# Patient Record
Sex: FEMALE | Race: WHITE | Employment: UNEMPLOYED | URBAN - METROPOLITAN AREA
[De-identification: names, ages, dates, MRNs, and addresses within clinical notes are randomized per-mention and may not be internally consistent; named-entity substitution may affect disease eponyms.]

---

## 2017-01-06 ENCOUNTER — RADIANT APPOINTMENT (OUTPATIENT)
Dept: MAMMOGRAPHY | Facility: CLINIC | Age: 52
End: 2017-01-06
Attending: NURSE PRACTITIONER
Payer: COMMERCIAL

## 2017-01-06 DIAGNOSIS — Z12.31 VISIT FOR SCREENING MAMMOGRAM: ICD-10-CM

## 2017-01-06 DIAGNOSIS — Z00.00 ROUTINE HEALTH MAINTENANCE: ICD-10-CM

## 2017-01-06 PROCEDURE — G0202 SCR MAMMO BI INCL CAD: HCPCS | Mod: TC

## 2017-01-20 ENCOUNTER — TELEPHONE (OUTPATIENT)
Dept: PEDIATRICS | Facility: CLINIC | Age: 52
End: 2017-01-20

## 2017-01-20 DIAGNOSIS — Z12.11 SPECIAL SCREENING FOR MALIGNANT NEOPLASMS, COLON: Primary | ICD-10-CM

## 2017-01-20 NOTE — TELEPHONE ENCOUNTER
1/20/2017    Call Regarding Preventive Health Screening Colonoscopy    Attempt 1    Message on voicemail     Comments:           Outreach   soto

## 2017-04-14 DIAGNOSIS — N95.1 MENOPAUSAL SYNDROME (HOT FLASHES): ICD-10-CM

## 2017-04-14 NOTE — TELEPHONE ENCOUNTER
estrogen, conjugated,-medroxyPROGESTERone (PREMPRO) 0.625-2.5 MG per tablet  Last Written Prescription Date: 12/13/16  Last Fill Quantity: 60, # refills: 0  Last Office Visit with G, [facility] or [facility] prescribing provider: 12/13/16       BP Readings from Last 3 Encounters:   12/13/16 116/74   10/23/15 106/76   08/17/15 98/64     Date of last Breast Exam: 1/6/2017

## 2017-04-14 NOTE — TELEPHONE ENCOUNTER
Prescription approved per [facility] Refill Protocol.  Natty Rodgers, RN  Triage Nurse

## 2017-05-17 NOTE — TELEPHONE ENCOUNTER
5/17/2017    Call Regarding Preventive Health Screening Colonoscopy    Attempt 2    Message on voicemail     Comments:       Outreach   soto

## 2017-09-26 NOTE — TELEPHONE ENCOUNTER
Patient calling back. States she did mail this back, was in February she believes she did this.    Routing back to Nasra Sandoval and PCP for fyi.    Thanks  Wero SPENCER  Team Coodinator

## 2017-09-26 NOTE — TELEPHONE ENCOUNTER
Please let her know we still haven't received her FIT. I ordered a new one at the clinic in case she lost it.

## 2017-10-02 NOTE — TELEPHONE ENCOUNTER
Lab never received test kit.  Called and left VM to  new kit or call if questions.  Aura Wells, CMA

## 2017-12-14 DIAGNOSIS — G43.009 MIGRAINE WITHOUT AURA AND WITHOUT STATUS MIGRAINOSUS, NOT INTRACTABLE: ICD-10-CM

## 2017-12-19 RX ORDER — SUMATRIPTAN 100 MG/1
100 TABLET, FILM COATED ORAL
Qty: 9 TABLET | Refills: 0 | Status: SHIPPED | OUTPATIENT
Start: 2017-12-19 | End: 2018-01-25

## 2017-12-19 NOTE — TELEPHONE ENCOUNTER
Patient is due for annual visit, this is filled x 1,   Please call patient to schedule this.   Natty Rodgers, MAIKEL  Triage Nurse

## 2018-01-25 ENCOUNTER — OFFICE VISIT (OUTPATIENT)
Dept: PEDIATRICS | Facility: CLINIC | Age: 53
End: 2018-01-25
Payer: COMMERCIAL

## 2018-01-25 VITALS
HEIGHT: 67 IN | BODY MASS INDEX: 28.72 KG/M2 | WEIGHT: 183 LBS | OXYGEN SATURATION: 99 % | DIASTOLIC BLOOD PRESSURE: 72 MMHG | TEMPERATURE: 97.6 F | SYSTOLIC BLOOD PRESSURE: 96 MMHG | HEART RATE: 56 BPM

## 2018-01-25 DIAGNOSIS — M25.50 MULTIPLE JOINT PAIN: ICD-10-CM

## 2018-01-25 DIAGNOSIS — Z00.00 ROUTINE GENERAL MEDICAL EXAMINATION AT A HEALTH CARE FACILITY: Primary | ICD-10-CM

## 2018-01-25 DIAGNOSIS — Z12.11 SPECIAL SCREENING FOR MALIGNANT NEOPLASMS, COLON: ICD-10-CM

## 2018-01-25 DIAGNOSIS — H61.22 IMPACTED CERUMEN OF LEFT EAR: ICD-10-CM

## 2018-01-25 DIAGNOSIS — G43.009 MIGRAINE WITHOUT AURA AND WITHOUT STATUS MIGRAINOSUS, NOT INTRACTABLE: ICD-10-CM

## 2018-01-25 DIAGNOSIS — E55.9 VITAMIN D DEFICIENCY: ICD-10-CM

## 2018-01-25 DIAGNOSIS — N95.1 MENOPAUSAL SYNDROME (HOT FLASHES): ICD-10-CM

## 2018-01-25 DIAGNOSIS — Z13.220 SCREENING CHOLESTEROL LEVEL: ICD-10-CM

## 2018-01-25 DIAGNOSIS — L98.9 SKIN LESION: ICD-10-CM

## 2018-01-25 DIAGNOSIS — Z13.1 SCREENING FOR DIABETES MELLITUS: ICD-10-CM

## 2018-01-25 LAB
ALBUMIN SERPL-MCNC: 4.3 G/DL (ref 3.4–5)
ALP SERPL-CCNC: 47 U/L (ref 40–150)
ALT SERPL W P-5'-P-CCNC: 22 U/L (ref 0–50)
ANION GAP SERPL CALCULATED.3IONS-SCNC: 6 MMOL/L (ref 3–14)
AST SERPL W P-5'-P-CCNC: 22 U/L (ref 0–45)
BILIRUB SERPL-MCNC: 0.6 MG/DL (ref 0.2–1.3)
BUN SERPL-MCNC: 12 MG/DL (ref 7–30)
CALCIUM SERPL-MCNC: 9.1 MG/DL (ref 8.5–10.1)
CHLORIDE SERPL-SCNC: 106 MMOL/L (ref 94–109)
CHOLEST SERPL-MCNC: 220 MG/DL
CO2 SERPL-SCNC: 27 MMOL/L (ref 20–32)
CREAT SERPL-MCNC: 0.82 MG/DL (ref 0.52–1.04)
DEPRECATED CALCIDIOL+CALCIFEROL SERPL-MC: 16 UG/L (ref 20–75)
ERYTHROCYTE [SEDIMENTATION RATE] IN BLOOD BY WESTERGREN METHOD: 10 MM/H (ref 0–30)
GFR SERPL CREATININE-BSD FRML MDRD: 73 ML/MIN/1.7M2
GLUCOSE SERPL-MCNC: 83 MG/DL (ref 70–99)
HDLC SERPL-MCNC: 54 MG/DL
LDLC SERPL CALC-MCNC: 141 MG/DL
NONHDLC SERPL-MCNC: 166 MG/DL
POTASSIUM SERPL-SCNC: 3.9 MMOL/L (ref 3.4–5.3)
PROT SERPL-MCNC: 7.8 G/DL (ref 6.8–8.8)
RHEUMATOID FACT SER NEPH-ACNC: <20 IU/ML (ref 0–20)
SODIUM SERPL-SCNC: 139 MMOL/L (ref 133–144)
TRIGL SERPL-MCNC: 126 MG/DL

## 2018-01-25 PROCEDURE — 86200 CCP ANTIBODY: CPT | Performed by: INTERNAL MEDICINE

## 2018-01-25 PROCEDURE — 85652 RBC SED RATE AUTOMATED: CPT | Performed by: INTERNAL MEDICINE

## 2018-01-25 PROCEDURE — 80053 COMPREHEN METABOLIC PANEL: CPT | Performed by: INTERNAL MEDICINE

## 2018-01-25 PROCEDURE — 36415 COLL VENOUS BLD VENIPUNCTURE: CPT | Performed by: INTERNAL MEDICINE

## 2018-01-25 PROCEDURE — 82306 VITAMIN D 25 HYDROXY: CPT | Performed by: INTERNAL MEDICINE

## 2018-01-25 PROCEDURE — 99213 OFFICE O/P EST LOW 20 MIN: CPT | Mod: 25 | Performed by: INTERNAL MEDICINE

## 2018-01-25 PROCEDURE — 86431 RHEUMATOID FACTOR QUANT: CPT | Performed by: INTERNAL MEDICINE

## 2018-01-25 PROCEDURE — 99396 PREV VISIT EST AGE 40-64: CPT | Performed by: INTERNAL MEDICINE

## 2018-01-25 PROCEDURE — 80061 LIPID PANEL: CPT | Performed by: INTERNAL MEDICINE

## 2018-01-25 RX ORDER — SUMATRIPTAN 100 MG/1
100 TABLET, FILM COATED ORAL
Qty: 9 TABLET | Refills: 11 | Status: SHIPPED | OUTPATIENT
Start: 2018-01-25 | End: 2019-01-31

## 2018-01-25 ASSESSMENT — ANXIETY QUESTIONNAIRES
5. BEING SO RESTLESS THAT IT IS HARD TO SIT STILL: NOT AT ALL
IF YOU CHECKED OFF ANY PROBLEMS ON THIS QUESTIONNAIRE, HOW DIFFICULT HAVE THESE PROBLEMS MADE IT FOR YOU TO DO YOUR WORK, TAKE CARE OF THINGS AT HOME, OR GET ALONG WITH OTHER PEOPLE: NOT DIFFICULT AT ALL
3. WORRYING TOO MUCH ABOUT DIFFERENT THINGS: NOT AT ALL
6. BECOMING EASILY ANNOYED OR IRRITABLE: SEVERAL DAYS
7. FEELING AFRAID AS IF SOMETHING AWFUL MIGHT HAPPEN: NOT AT ALL
GAD7 TOTAL SCORE: 1
1. FEELING NERVOUS, ANXIOUS, OR ON EDGE: NOT AT ALL
2. NOT BEING ABLE TO STOP OR CONTROL WORRYING: NOT AT ALL

## 2018-01-25 ASSESSMENT — PATIENT HEALTH QUESTIONNAIRE - PHQ9: 5. POOR APPETITE OR OVEREATING: NOT AT ALL

## 2018-01-25 NOTE — MR AVS SNAPSHOT
After Visit Summary   1/25/2018    Sharon Nascimento    MRN: [id number]           Patient Information     Date Of Birth          1965        Visit Information        Provider Department      1/25/2018 8:40 AM Lashawn Palafox MD [facility]        Today's Diagnoses     Routine general medical examination at a health care facility    -  1    Vitamin D deficiency        Multiple joint pain        Special screening for malignant neoplasms, colon        Screening cholesterol level        Screening for diabetes mellitus        Migraine without aura and without status migrainosus, not intractable        Menopausal syndrome (hot flashes)        Skin lesion          Care Instructions      Preventive Health Recommendations  Female Ages 50 - 64    Yearly exam: See your health care provider every year in order to  o Review health changes.   o Discuss preventive care.    o Review your medicines if your doctor has prescribed any.      Get a Pap test every three years (unless you have an abnormal result and your provider advises testing more often).    If you get Pap tests with HPV test, you only need to test every 5 years, unless you have an abnormal result.     You do not need a Pap test if your uterus was removed (hysterectomy) and you have not had cancer.    You should be tested each year for STDs (sexually transmitted diseases) if you're at risk.     Have a mammogram every 1 to 2 years.    Have a colonoscopy at age 50, or have a yearly FIT test (stool test). These exams screen for colon cancer.      Have a cholesterol test every 5 years, or more often if advised.    Have a diabetes test (fasting glucose) every three years. If you are at risk for diabetes, you should have this test more often.     If you are at risk for osteoporosis (brittle bone disease), think about having a bone density scan (DEXA).    Shots: Get a flu shot each year. Get a tetanus shot every 10 years.    Nutrition:     Eat  at least 5 servings of fruits and vegetables each day.    Eat whole-grain bread, whole-wheat pasta and brown rice instead of white grains and rice.    Talk to your provider about Calcium and Vitamin D.     Lifestyle    Exercise at least 150 minutes a week (30 minutes a day, 5 days a week). This will help you control your weight and prevent disease.    Limit alcohol to one drink per day.    No smoking.     Wear sunscreen to prevent skin cancer.     See your dentist every six months for an exam and cleaning.    See your eye doctor every 1 to 2 years.  -----------  1. Labs today: cholesterol, diabetes screen, liver function, kidney function, vitamin D, sed rate (looks at inflammation) and rheumatoid arthritis testing  2. Ok to continue aleve  3. Taking glucosamine/chondroitin can slow progression of arthritis  4. Mammogram next year  5. Refilled imitrex, put prempro on file  6. Wash out ear on left  7. Referral for dermatology for skin check          Follow-ups after your visit        Additional Services     DERMATOLOGY REFERRAL       Your provider has referred you to: [facility]: [facility] - [place] [phone]   [email or web address]    Please be aware that coverage of these services is subject to the terms and limitations of your health insurance plan.  Call member services at your health plan with any benefit or coverage questions.      Please bring the following with you to your appointment:    (1) Any X-Rays, CTs or MRIs which have been performed.  Contact the facility where they were done to arrange for  prior to your scheduled appointment.    (2) List of current medications  (3) This referral request   (4) Any documents/labs given to you for this referral                  Future tests that were ordered for you today     Open Future Orders        Priority Expected Expires Ordered    Fecal colorectal cancer screen (FIT) Routine 2/15/2018 4/19/2018 1/25/2018            Who to contact      "If you have questions or need follow up information about today's clinic visit or your schedule please contact [facility] [place] directly at [phone].  Normal or non-critical lab and imaging results will be communicated to you by MyChart, letter or phone within 4 business days after the clinic has received the results. If you do not hear from us within 7 days, please contact the clinic through [employer] or phone. If you have a critical or abnormal lab result, we will notify you by phone as soon as possible.  Submit refill requests through [employer] or call your pharmacy and they will forward the refill request to us. Please allow 3 business days for your refill to be completed.          Additional Information About Your Visit        [employer] Information     [employer] gives you secure access to your electronic health record. If you see a primary care provider, you can also send messages to your care team and make appointments. If you have questions, please call your primary care clinic.  If you do not have a primary care provider, please call [phone] and they will assist you.        Care EveryWhere ID     This is your Care EveryWhere ID. This could be used by other organizations to access your [place] medical records  [id number]        Your Vitals Were     Pulse Temperature Height Last Period Pulse Oximetry BMI (Body Mass Index)    56 97.6  F (36.4  C) (Tympanic) 5' 7\" (1.702 m) 11/15/2015 (Approximate) 99% 28.66 kg/m2       Blood Pressure from Last 3 Encounters:   01/25/18 96/72   12/13/16 116/74   10/23/15 106/76    Weight from Last 3 Encounters:   01/25/18 183 lb (83 kg)   12/13/16 189 lb 11.2 oz (86 kg)   10/23/15 182 lb (82.6 kg)              We Performed the Following     Comprehensive metabolic panel     Cyclic Citrullinated Peptide Antibody IgG     DERMATOLOGY REFERRAL     Erythrocyte sedimentation rate auto     Lipid panel reflex to direct LDL Fasting     Rheumatoid factor     Vitamin D Deficiency  "         Where to get your medicines      These medications were sent to [facility]/pharmacy #[id number] - [place], [place] - [address] AT COR[address] OF [facility]  [address], [place] [address]     Phone:  [phone]     estrogen (conjugated)-medroxyPROGESTERone 0.625-2.5 MG per tablet    SUMAtriptan 100 MG tablet          Primary Care Provider Office Phone # Fax #    Lashawn Palafox [phone]       [facility] [place] [address]        Equal Access to Services     [facility]: Hadii aad ku hadasho Soomaali, waaxda luqadaha, qaybta kaalmada adeegyapatria, jannie mayfield . So [facility] [phone].    ATENCIÓN: Si habla español, tiene a dick disposición servicios gratuitos de asistencia lingüística. [facility] [phone].    We comply with applicable federal civil rights laws and [place] laws. We do not discriminate on the basis of race, color, national origin, age, disability, sex, sexual orientation, or gender identity.            Thank you!     Thank you for choosing [facility]  for your care. Our goal is always to provide you with excellent care. Hearing back from our patients is one way we can continue to improve our services. Please take a few minutes to complete the written survey that you may receive in the mail after your visit with us. Thank you!             Your Updated Medication List - Protect others around you: Learn how to safely use, store and throw away your medicines at www.disposemymeds.org.          This list is accurate as of 1/25/18  9:25 AM.  Always use your most recent med list.                   Brand Name Dispense Instructions for use Diagnosis    estrogen (conjugated)-medroxyPROGESTERone 0.625-2.5 MG per tablet    PREMPRO    90 tablet    Take 1 tablet by mouth daily    Menopausal syndrome (hot flashes)       SUMAtriptan 100 MG tablet    IMITREX    9 tablet    Take 1 tablet (100 mg) by mouth at onset of headache  for migraine May repeat in 2 hours if needed: max 2/day;    Migraine without aura and without status migrainosus, not intractable

## 2018-01-25 NOTE — NURSING NOTE
"Chief Complaint   Patient presents with     Physical       Initial BP 96/72 (BP Location: Right arm, Patient Position: Chair, Cuff Size: Adult Regular)  Pulse 56  Temp 97.6  F (36.4  C) (Tympanic)  Ht 5' 7\" (1.702 m)  Wt 183 lb (83 kg)  LMP 11/15/2015 (Approximate)  SpO2 99%  BMI 28.66 kg/m2 Estimated body mass index is 28.66 kg/(m^2) as calculated from the following:    Height as of this encounter: 5' 7\" (1.702 m).    Weight as of this encounter: 183 lb (83 kg).  Medication Reconciliation: complete   Britt Gonsalez LPN      "

## 2018-01-25 NOTE — PROGRESS NOTES
SUBJECTIVE:   CC: Sharon Nascimento is an 52 year old woman who presents for preventive health visit.     Physical   Annual:     Getting at least 3 servings of Calcium per day::  NO    Bi-annual eye exam::  Yes    Dental care twice a year::  Yes    Sleep apnea or symptoms of sleep apnea::  None    Diet::  Regular (no restrictions)    Frequency of exercise::  4-5 days/week    Duration of exercise::  45-60 minutes    Taking medications regularly::  No    Barriers to taking medications::  Problems remembering to take them    Additional concerns today::  YES            Left ear - feels plugged over last couple of months. Opens up if rubs it. No drainage and no pain. Wearing ear buds more with exercising.     Joints - feel stiff. Base of thumbs. Knees clicks with walking up stairs. Sometimes hard to get up stairs. Has been working out more. Both knees affected. If bends down to pick something up, has to push self up and almost feel like they lock. Mom has bad arthritis in neck and knees. Uses aleve on a daily basis. This summer had a lot of stiffness and pain in MCP joints and PIP joints as well.    Hot flashes - prempro helps when remember to take. A 2 month supply usually lasts her about 3 months    Anxiety Follow-Up    Status since last visit: Improved     Other associated symptoms:None    Complicating factors:   Significant life event: No   Current substance abuse: None  Depression symptoms: No  DALY-7 SCORE 2/11/2014 1/25/2018   Total Score 2 -   Total Score - 1       DALY-7  Migraine Follow-Up    Headaches symptoms:  Stable     Frequency: 2x/month     Duration of headaches: 2-3 days    Able to do normal daily activities/work with migraines: Yes    Rescue/Relief medication:ibuprofen (Advil, Motrin) and sumatriptan (Imitrex)              Effectiveness: total relief    Preventative medication: None    Neurologic complications: No new stroke-like symptoms, loss of vision or speech, numbness or weakness    In the past 4  weeks, how often have you gone to Urgent Care or the emergency room because of your headaches?  0     Had a migraine for about a week the early part of this month. Had tried getting sugar out of the diet around that time. Otherwise has been stable. imitrex is helpful.     Today's PHQ-2 Score:   PHQ-2 ( 1999 Pfizer) 1/25/2018   Q1: Little interest or pleasure in doing things 0   Q2: Feeling down, depressed or hopeless 0   PHQ-2 Score 0   Q1: Little interest or pleasure in doing things Not at all   Q2: Feeling down, depressed or hopeless Not at all   PHQ-2 Score 0     Abuse: Current or Past(Physical, Sexual or Emotional)- No  Do you feel safe in your environment - Yes    Social History   Substance Use Topics     Smoking status: Never Smoker     Smokeless tobacco: Never Used     Alcohol use Yes      Comment: 2 times per month, 1-3 drinks per time     Alcohol Use 1/25/2018   If you drink alcohol, do you typically have greater than 3 drinks per day OR greater than 7 drinks per week?   No     Reviewed orders with patient.  Reviewed health maintenance and updated orders accordingly - Yes  Patient Active Problem List   Diagnosis     ADD (attention deficit disorder)     Migraine     Mood swings (H)     CARDIOVASCULAR SCREENING; LDL GOAL LESS THAN 160     Vitamin D deficiency     Anxiety     Past Surgical History:   Procedure Laterality Date     LASIK         Social History   Substance Use Topics     Smoking status: Never Smoker     Smokeless tobacco: Never Used     Alcohol use Yes      Comment: 2 times per month, 1-3 drinks per time     Family History   Problem Relation Age of Onset     Psychotic Disorder Daughter      ADD     Psychotic Disorder Brother      ADD     OSTEOPOROSIS Mother      Arthritis Mother      CANCER Father      throat         Patient over age 50, mutual decision to screen reflected in health maintenance.    Pertinent mammograms are reviewed under the imaging tab.  History of abnormal Pap smear: NO - age  "30-65 PAP every 5 years with negative HPV co-testing recommended    Reviewed and updated as needed this visit by clinical staff  Tobacco  Allergies  Meds  Problems  Med Hx  Surg Hx  Fam Hx  Soc Hx          Reviewed and updated as needed this visit by Provider  Allergies  Meds  Problems        Review of Systems  C: hot flashes, NEGATIVE for fever, chills, change in weight  I: NEGATIVE for worrisome rashes, moles or lesions  E: NEGATIVE for vision changes or irritation  ENT: left ear fullness, NEGATIVE for mouth and throat problems  R: NEGATIVE for significant cough or SOB  B: NEGATIVE for masses, tenderness or discharge  CV: NEGATIVE for chest pain, palpitations or peripheral edema  GI: NEGATIVE for nausea, abdominal pain, heartburn, or change in bowel habits  : NEGATIVE for unusual urinary or vaginal symptoms. No vaginal bleeding.  M: multiple joint pains - now mostly thumb and knees, having hand pains this summer NEGATIVE for significant arthralgias or myalgia  N: NEGATIVE for weakness, dizziness or paresthesias  P: NEGATIVE for changes in mood or affect      OBJECTIVE:   BP 96/72 (BP Location: Right arm, Patient Position: Chair, Cuff Size: Adult Regular)  Pulse 56  Temp 97.6  F (36.4  C) (Tympanic)  Ht 5' 7\" (1.702 m)  Wt 183 lb (83 kg)  LMP 11/15/2015 (Approximate)  SpO2 99%  BMI 28.66 kg/m2  Physical Exam  GENERAL: healthy, alert and no distress  EYES: Eyes grossly normal to inspection, PERRL and conjunctivae and sclerae normal  HENT: ear canal and TM normal on right, left canal occluded with wax, nose and mouth without ulcers or lesions  NECK: no adenopathy, no asymmetry, masses, or scars and thyroid normal to palpation  RESP: lungs clear to auscultation - no rales, rhonchi or wheezes  BREAST: normal without masses, tenderness or nipple discharge and no palpable axillary masses or adenopathy  CV: regular rate and rhythm, normal S1 S2, no S3 or S4, no murmur, click or rub, no peripheral edema " and peripheral pulses strong  ABDOMEN: soft, nontender, no hepatosplenomegaly, no masses and bowel sounds normal  MS: no gross musculoskeletal defects noted, no edema  SKIN: skin tag in right axilla, multiple lentigos on face, tan papules over anterior chest,  no suspicious lesions or rashes  NEURO: Normal strength and tone, mentation intact and speech normal  PSYCH: mentation appears normal, affect normal/bright    ASSESSMENT/PLAN:   1. Routine general medical examination at a health care facility  Pap q5 years - due 2020  Mammo due next year  Declines colonoscopy, but will return fit testing  Tdap UTD  Declines flu vaccine    2. Vitamin D deficiency  Has been low. Would like to recheck. Not currently on supplementation.  - Vitamin D Deficiency    3. Multiple joint pain  Most likely OA, but given hand involvement with MCPs, PIPs over the summer, will r/o RA. Also check vitamin D. Recommended glucosamine/chondroitin for OA of knees. Ok to continue aleve as long as not having gastritis and kidney function ok. Discussed sports medicine and injections next step.  - Erythrocyte sedimentation rate auto  - Cyclic Citrullinated Peptide Antibody IgG  - Rheumatoid factor  - OFFICE/OUTPT VISIT,EST,LEVL III    4. Skin lesion  No specific lesions but would like a skin check. [facility].  - DERMATOLOGY REFERRAL    5. Menopausal syndrome (hot flashes)  Controlled when takes medication. Sx return when she forgets the medication. Continue.  - estrogen, conjugated,-medroxyPROGESTERone (PREMPRO) 0.625-2.5 MG per tablet; Take 1 tablet by mouth daily  Dispense: 90 tablet; Refill: 3    6. Migraine without aura and without status migrainosus, not intractable  Controlled. Continue imitrex as needed.  - SUMAtriptan (IMITREX) 100 MG tablet; Take 1 tablet (100 mg) by mouth at onset of headache for migraine May repeat in 2 hours if needed: max 2/day;  Dispense: 9 tablet; Refill: 11    7. Impacted cerumen of left ear  Ear  "wash performed in clinic. Discussed debrox. May return for nurse only ear wash if develops fullness again. Likely exacerbated by wearing ear buds.  - OFFICE/OUTPT VISIT,EST,LEVL III    8. Screening cholesterol level  - Lipid panel reflex to direct LDL Fasting    9. Screening for diabetes mellitus  - Comprehensive metabolic panel    10. Special screening for malignant neoplasms, colon  - Fecal colorectal cancer screen (FIT); Future    COUNSELING:  Reviewed preventive health counseling, as reflected in patient instructions  Special attention given to:        Regular exercise       Healthy diet/nutrition       Immunizations    Declined: Influenza due to Conscientious objector           Colon cancer screening       (Naz)menopause management     reports that she has never smoked. She has never used smokeless tobacco.    Estimated body mass index is 28.66 kg/(m^2) as calculated from the following:    Height as of this encounter: 5' 7\" (1.702 m).    Weight as of this encounter: 183 lb (83 kg).   Weight management plan: Discussed healthy diet and exercise guidelines and patient will follow up in 12 months in clinic to re-evaluate.    Counseling Resources:  ATP IV Guidelines  Pooled Cohorts Equation Calculator  Breast Cancer Risk Calculator  FRAX Risk Assessment  ICSI Preventive Guidelines  Dietary Guidelines for Americans, 2010  USDA's MyPlate  ASA Prophylaxis  Lung CA Screening    Lashawn Palafox MD  [facility] [place]      Answers for HPI/ROS submitted by the patient on 1/25/2018   PHQ-2 Score: 0    "

## 2018-01-25 NOTE — PATIENT INSTRUCTIONS
Preventive Health Recommendations  Female Ages 50 - 64    Yearly exam: See your health care provider every year in order to  o Review health changes.   o Discuss preventive care.    o Review your medicines if your doctor has prescribed any.      Get a Pap test every three years (unless you have an abnormal result and your provider advises testing more often).    If you get Pap tests with HPV test, you only need to test every 5 years, unless you have an abnormal result.     You do not need a Pap test if your uterus was removed (hysterectomy) and you have not had cancer.    You should be tested each year for STDs (sexually transmitted diseases) if you're at risk.     Have a mammogram every 1 to 2 years.    Have a colonoscopy at age 50, or have a yearly FIT test (stool test). These exams screen for colon cancer.      Have a cholesterol test every 5 years, or more often if advised.    Have a diabetes test (fasting glucose) every three years. If you are at risk for diabetes, you should have this test more often.     If you are at risk for osteoporosis (brittle bone disease), think about having a bone density scan (DEXA).    Shots: Get a flu shot each year. Get a tetanus shot every 10 years.    Nutrition:     Eat at least 5 servings of fruits and vegetables each day.    Eat whole-grain bread, whole-wheat pasta and brown rice instead of white grains and rice.    Talk to your provider about Calcium and Vitamin D.     Lifestyle    Exercise at least 150 minutes a week (30 minutes a day, 5 days a week). This will help you control your weight and prevent disease.    Limit alcohol to one drink per day.    No smoking.     Wear sunscreen to prevent skin cancer.     See your dentist every six months for an exam and cleaning.    See your eye doctor every 1 to 2 years.  -----------  1. Labs today: cholesterol, diabetes screen, liver function, kidney function, vitamin D, sed rate (looks at inflammation) and rheumatoid arthritis  testing  2. Ok to continue aleve  3. Taking glucosamine/chondroitin can slow progression of arthritis  4. Mammogram next year  5. Refilled imitrex, put prempro on file  6. Wash out ear on left  7. Referral for dermatology for skin check

## 2018-01-26 LAB — CCP AB SER IA-ACNC: 1 U/ML

## 2018-01-26 ASSESSMENT — ANXIETY QUESTIONNAIRES: GAD7 TOTAL SCORE: 1

## 2018-01-27 RX ORDER — ERGOCALCIFEROL 1.25 MG/1
50000 CAPSULE, LIQUID FILLED ORAL
Qty: 8 CAPSULE | Refills: 0 | Status: SHIPPED | OUTPATIENT
Start: 2018-01-27 | End: 2018-03-18

## 2018-02-06 PROCEDURE — 82274 ASSAY TEST FOR BLOOD FECAL: CPT | Performed by: INTERNAL MEDICINE

## 2018-02-07 DIAGNOSIS — Z12.11 SPECIAL SCREENING FOR MALIGNANT NEOPLASMS, COLON: ICD-10-CM

## 2018-02-07 LAB — HEMOCCULT STL QL IA: NEGATIVE

## 2019-01-31 DIAGNOSIS — G43.009 MIGRAINE WITHOUT AURA AND WITHOUT STATUS MIGRAINOSUS, NOT INTRACTABLE: ICD-10-CM

## 2019-01-31 NOTE — TELEPHONE ENCOUNTER
"Requested Prescriptions   Pending Prescriptions Disp Refills     SUMAtriptan (IMITREX) 100 MG tablet [Pharmacy Med Name: SUMATRIPTAN SUCC 100 MG TABLET]    Last Written Prescription Date:  1/25/2018  Last Fill Quantity: 90,  # refills: 3   Last office visit: 1/25/2018 with prescribing provider: Lashawn Palafox       Future Office Visit:     9 tablet 4     Sig: TAKE 1 TABLET BY MOUTH AT ONSET OF MIGRAINE. MAY REPEAST IN 2 HRS IF NEEDED. MAX 2 TABLETS PER 24HR    Serotonin Agonists Failed - 1/31/2019  3:36 PM       Failed - Blood pressure under 140/90 in past 12 months    BP Readings from Last 3 Encounters:   01/25/18 96/72   12/13/16 116/74   10/23/15 106/76                Failed - Serotonin Agonist request needs review.    Please review patient's record. If patient has had 8 or more treatments in the past month, please forward to provider.         Failed - Recent (12 mo) or future (30 days) visit within the authorizing provider's specialty    Patient had office visit in the last 12 months or has a visit in the next 30 days with authorizing provider or within the authorizing provider's specialty.  See \"Patient Info\" tab in inbasket, or \"Choose Columns\" in Meds & Orders section of the refill encounter.             Passed - Medication is active on med list       Passed - Patient is age 18 or older       Passed - No active pregnancy on record       Passed - No positive pregnancy test in past 12 months          "

## 2019-02-01 RX ORDER — SUMATRIPTAN 100 MG/1
TABLET, FILM COATED ORAL
Qty: 9 TABLET | Refills: 0 | Status: SHIPPED | OUTPATIENT
Start: 2019-02-01 | End: 2019-02-27

## 2019-02-01 NOTE — TELEPHONE ENCOUNTER
30 day supply given.  Patient is due for yearly physical and lab work.  Please call and assist with scheduling appointment prior to next refill   Lashawn MOLINA RN - Triage  [facility]

## 2019-02-27 ENCOUNTER — OFFICE VISIT (OUTPATIENT)
Dept: PEDIATRICS | Facility: CLINIC | Age: 54
End: 2019-02-27
Payer: COMMERCIAL

## 2019-02-27 VITALS
BODY MASS INDEX: 29.58 KG/M2 | SYSTOLIC BLOOD PRESSURE: 98 MMHG | HEART RATE: 66 BPM | DIASTOLIC BLOOD PRESSURE: 60 MMHG | WEIGHT: 188.5 LBS | TEMPERATURE: 97.6 F | HEIGHT: 67 IN | OXYGEN SATURATION: 97 %

## 2019-02-27 DIAGNOSIS — N95.1 MENOPAUSAL SYNDROME (HOT FLASHES): ICD-10-CM

## 2019-02-27 DIAGNOSIS — Z12.11 SCREEN FOR COLON CANCER: ICD-10-CM

## 2019-02-27 DIAGNOSIS — G43.009 MIGRAINE WITHOUT AURA AND WITHOUT STATUS MIGRAINOSUS, NOT INTRACTABLE: ICD-10-CM

## 2019-02-27 DIAGNOSIS — Z12.31 VISIT FOR SCREENING MAMMOGRAM: ICD-10-CM

## 2019-02-27 DIAGNOSIS — Z00.00 ROUTINE HISTORY AND PHYSICAL EXAMINATION OF ADULT: Primary | ICD-10-CM

## 2019-02-27 PROCEDURE — 99396 PREV VISIT EST AGE 40-64: CPT | Performed by: INTERNAL MEDICINE

## 2019-02-27 RX ORDER — SUMATRIPTAN 100 MG/1
TABLET, FILM COATED ORAL
Qty: 27 TABLET | Refills: 3 | Status: SHIPPED | OUTPATIENT
Start: 2019-02-27 | End: 2020-03-31

## 2019-02-27 ASSESSMENT — ENCOUNTER SYMPTOMS
CONSTIPATION: 0
ABDOMINAL PAIN: 0
NERVOUS/ANXIOUS: 0
CHILLS: 0
DIARRHEA: 0
HEMATURIA: 0
COUGH: 0
HEMATOCHEZIA: 0
EYE PAIN: 0
FEVER: 0
DIZZINESS: 0

## 2019-02-27 ASSESSMENT — MIFFLIN-ST. JEOR: SCORE: 1501.62

## 2019-02-27 NOTE — PATIENT INSTRUCTIONS

## 2019-02-27 NOTE — PROGRESS NOTES
SUBJECTIVE:   CC: Sharon Nascimento is an 53 year old woman who presents for preventive health visit.     Physical   Annual:     Getting at least 3 servings of Calcium per day:  NO    Bi-annual eye exam:  Yes    Dental care twice a year:  Yes    Sleep apnea or symptoms of sleep apnea:  None    Diet:  Regular (no restrictions)    Frequency of exercise:  2-3 days/week    Duration of exercise:  30-45 minutes    Taking medications regularly:  Yes    Medication side effects:  Not applicable    Additional concerns today:  No    PHQ-2 Total Score: 0    Migraine Follow-Up    Headaches symptoms:  Stable     Frequency: 2-3 times a month     Duration of headaches: 1-2 days    Able to do normal daily activities/work with migraines: Yes    Rescue/Relief medication:sumatriptan (Imitrex)              Effectiveness: total relief    Preventative medication: None    Neurologic complications: No new stroke-like symptoms, loss of vision or speech, numbness or weakness    In the past 4 weeks, how often have you gone to Urgent Care or the emergency room because of your headaches?  0    Having 2-3 times a month. Imitrex is effective. Sometimes has to take a 2nd dose. Triggered by dehydration.     HRT: continues on hormones. When misses doses has hot flashes. No side effects.    Today's PHQ-2 Score:   PHQ-2 ( 1999 Pfizer) 2/27/2019   Q1: Little interest or pleasure in doing things 0   Q2: Feeling down, depressed or hopeless 0   PHQ-2 Score 0   Q1: Little interest or pleasure in doing things Not at all   Q2: Feeling down, depressed or hopeless Not at all   PHQ-2 Score 0     Abuse: Current or Past(Physical, Sexual or Emotional)- No  Do you feel safe in your environment? Yes    Social History     Tobacco Use     Smoking status: Never Smoker     Smokeless tobacco: Never Used   Substance Use Topics     Alcohol use: Yes     Comment: 2 times per month, 1-3 drinks per time     Alcohol Use 2/27/2019   If you drink alcohol do you typically have  greater than 3 drinks per day OR greater than 7 drinks per week? No     Reviewed orders with patient.  Reviewed health maintenance and updated orders accordingly - Yes  Patient Active Problem List   Diagnosis     ADD (attention deficit disorder)     Migraine     Mood swings     CARDIOVASCULAR SCREENING; LDL GOAL LESS THAN 160     Vitamin D deficiency     Anxiety     Past Surgical History:   Procedure Laterality Date     LASIK         Social History     Tobacco Use     Smoking status: Never Smoker     Smokeless tobacco: Never Used   Substance Use Topics     Alcohol use: Yes     Comment: 2 times per month, 1-3 drinks per time     Family History   Problem Relation Age of Onset     Psychotic Disorder Daughter         ADD     Psychotic Disorder Brother         ADD     Osteoporosis Mother      Arthritis Mother      Cancer Father         throat           Mammogram Screening: Patient over age 50, mutual decision to screen reflected in health maintenance.    Pertinent mammograms are reviewed under the imaging tab.  History of abnormal Pap smear: NO - age 30-65 PAP every 5 years with negative HPV co-testing recommended  PAP / HPV Latest Ref Rng & Units 10/23/2015 4/13/2012 12/8/2010   PAP - NIL NIL OTHER-NIL EM>40   HPV 16 DNA NEG Negative - -   HPV 18 DNA NEG Negative - -   OTHER HR HPV NEG Negative - -     Reviewed and updated as needed this visit by clinical staff  Tobacco  Allergies  Meds  Problems  Med Hx  Surg Hx  Fam Hx  Soc Hx        Reviewed and updated as needed this visit by Provider  Tobacco  Allergies  Meds  Problems  Med Hx  Surg Hx  Fam Hx        Review of Systems   Constitutional: Negative for chills and fever.   HENT: Negative for congestion and ear pain.    Eyes: Negative for pain.   Respiratory: Negative for cough.    Cardiovascular: Negative for chest pain.   Gastrointestinal: Negative for abdominal pain, constipation, diarrhea and hematochezia.   Genitourinary: Negative for hematuria.  "  Neurological: Negative for dizziness.   Psychiatric/Behavioral: The patient is not nervous/anxious.    All other systems reviewed and are negative.       OBJECTIVE:   BP 98/60 (BP Location: Right arm, Patient Position: Sitting, Cuff Size: Adult Regular)   Pulse 66   Temp 97.6  F (36.4  C) (Tympanic)   Ht 1.708 m (5' 7.25\")   Wt 85.5 kg (188 lb 8 oz)   LMP 11/15/2015 (Approximate)   SpO2 97%   BMI 29.30 kg/m    Physical Exam  GENERAL: healthy, alert and no distress  EYES: Eyes grossly normal to inspection, PERRL and conjunctivae and sclerae normal  HENT: ear canals and TM's normal, nose and mouth without ulcers or lesions  NECK: no adenopathy, no asymmetry, masses, or scars and thyroid normal to palpation  RESP: lungs clear to auscultation - no rales, rhonchi or wheezes  BREAST: deferred to mammogram  CV: regular rate and rhythm, normal S1 S2, no S3 or S4, no murmur, click or rub, no peripheral edema and peripheral pulses strong  ABDOMEN: soft, nontender, no hepatosplenomegaly, no masses and bowel sounds normal  MS: no gross musculoskeletal defects noted, no edema  SKIN: no suspicious lesions or rashes  NEURO: Normal strength and tone, mentation intact and speech normal  PSYCH: mentation appears normal, affect normal/bright    Diagnostic Test Results:  none     ASSESSMENT/PLAN:   1. Routine history and physical examination of adult  Pap UTD  Will schedule mammo  Will return fit testing, declines mammo  Discussed Shingrix - will hold off 1-2 years.  Will plan on checking fasting labs next year.    2. Migraine without aura and without status migrainosus, not intractable  Controlled. Continue imitrex as needed  - SUMAtriptan (IMITREX) 100 MG tablet; TAKE 1 TABLET BY MOUTH AT ONSET OF MIGRAINE. MAY REPEAST IN 2 HRS IF NEEDED. MAX 2 TABLETS PER 24HR  Dispense: 27 tablet; Refill: 3    3. Menopausal syndrome (hot flashes)  Continue prempro  - estrogen conj-medroxyPROGESTERone (PREMPRO) 0.625-2.5 MG tablet; Take 1 " "tablet by mouth daily  Dispense: 90 tablet; Refill: 3    4. Screen for colon cancer  - Fecal colorectal cancer screen (FIT); Future    5. Visit for screening mammogram  - MA SCREENING DIGITAL BILAT - Future  (s+30); Future    COUNSELING:  Reviewed preventive health counseling, as reflected in patient instructions  Special attention given to:        Regular exercise       Healthy diet/nutrition       Osteoporosis Prevention/Bone Health       Colon cancer screening    BP Readings from Last 1 Encounters:   02/27/19 98/60     Estimated body mass index is 29.3 kg/m  as calculated from the following:    Height as of this encounter: 1.708 m (5' 7.25\").    Weight as of this encounter: 85.5 kg (188 lb 8 oz).    Weight management plan: Discussed healthy diet and exercise guidelines     reports that  has never smoked. she has never used smokeless tobacco.      Counseling Resources:  ATP IV Guidelines  Pooled Cohorts Equation Calculator  Breast Cancer Risk Calculator  FRAX Risk Assessment  ICSI Preventive Guidelines  Dietary Guidelines for Americans, 2010  USDA's MyPlate  ASA Prophylaxis  Lung CA Screening    Lashawn Palafox MD  [facility] [place]  "

## 2019-06-18 ENCOUNTER — TELEPHONE (OUTPATIENT)
Dept: SCHEDULING | Facility: CLINIC | Age: 54
End: 2019-06-18

## 2019-07-11 ENCOUNTER — ANCILLARY PROCEDURE (OUTPATIENT)
Dept: MAMMOGRAPHY | Facility: CLINIC | Age: 54
End: 2019-07-11
Attending: INTERNAL MEDICINE
Payer: COMMERCIAL

## 2019-07-11 DIAGNOSIS — Z12.31 VISIT FOR SCREENING MAMMOGRAM: ICD-10-CM

## 2019-07-11 PROCEDURE — 77067 SCR MAMMO BI INCL CAD: CPT | Mod: TC

## 2019-07-11 PROCEDURE — 77063 BREAST TOMOSYNTHESIS BI: CPT | Mod: TC

## 2019-07-24 DIAGNOSIS — G43.009 MIGRAINE WITHOUT AURA AND WITHOUT STATUS MIGRAINOSUS, NOT INTRACTABLE: ICD-10-CM

## 2019-07-24 NOTE — TELEPHONE ENCOUNTER
"Requested Prescriptions   Pending Prescriptions Disp Refills     SUMAtriptan (IMITREX) 100 MG tablet [Pharmacy Med Name: SUMATRIPTAN SUCC 100 MG TABLET]  Last Written Prescription Date:  02/27/2019  Last Fill Quantity: 27 tbalet,  # refills: 3   Last Office Visit: 2/27/2019    Lashawn Palafox MD        Future Office Visit:      9 tablet 0     Sig: TAKE 1 TABLET BY MOUTH AT ONSET OF MIGRAINE. MAY REPEAST IN 2 HRS IF NEEDED. MAX 2 TABLETS PER 24HR       Serotonin Agonists Failed - 7/24/2019  5:01 PM        Failed - Serotonin Agonist request needs review.     Please review patient's record. If patient has had 8 or more treatments in the past month, please forward to provider.          Passed - Blood pressure under 140/90 in past 12 months     BP Readings from Last 3 Encounters:   02/27/19 98/60   01/25/18 96/72   12/13/16 116/74                 Passed - Recent (12 mo) or future (30 days) visit within the authorizing provider's specialty     Patient had office visit in the last 12 months or has a visit in the next 30 days with authorizing provider or within the authorizing provider's specialty.  See \"Patient Info\" tab in inbasket, or \"Choose Columns\" in Meds & Orders section of the refill encounter.              Passed - Medication is active on med list        Passed - Patient is age 18 or older        Passed - No active pregnancy on record        Passed - No positive pregnancy test in past 12 months          "

## 2019-07-25 RX ORDER — SUMATRIPTAN 100 MG/1
TABLET, FILM COATED ORAL
OUTPATIENT
Start: 2019-07-25

## 2019-07-25 NOTE — TELEPHONE ENCOUNTER
RN contacted pharmacy. Pharmacy states pt has refills remaining and disregard request.  GABI FarahN, RN

## 2020-06-04 ENCOUNTER — OFFICE VISIT (OUTPATIENT)
Dept: PEDIATRICS | Facility: CLINIC | Age: 55
End: 2020-06-04
Payer: COMMERCIAL

## 2020-06-04 VITALS
BODY MASS INDEX: 29.99 KG/M2 | TEMPERATURE: 97.8 F | OXYGEN SATURATION: 98 % | SYSTOLIC BLOOD PRESSURE: 126 MMHG | RESPIRATION RATE: 12 BRPM | DIASTOLIC BLOOD PRESSURE: 80 MMHG | WEIGHT: 191.1 LBS | HEIGHT: 67 IN | HEART RATE: 79 BPM

## 2020-06-04 DIAGNOSIS — G43.009 MIGRAINE WITHOUT AURA AND WITHOUT STATUS MIGRAINOSUS, NOT INTRACTABLE: ICD-10-CM

## 2020-06-04 DIAGNOSIS — R45.86 MOOD SWINGS: ICD-10-CM

## 2020-06-04 DIAGNOSIS — N63.0 BREAST MASS: Primary | ICD-10-CM

## 2020-06-04 PROCEDURE — 99214 OFFICE O/P EST MOD 30 MIN: CPT | Performed by: NURSE PRACTITIONER

## 2020-06-04 RX ORDER — SUMATRIPTAN 100 MG/1
TABLET, FILM COATED ORAL
Qty: 27 TABLET | Refills: 3 | Status: SHIPPED | OUTPATIENT
Start: 2020-06-04 | End: 2021-11-03

## 2020-06-04 SDOH — HEALTH STABILITY: MENTAL HEALTH: HOW MANY STANDARD DRINKS CONTAINING ALCOHOL DO YOU HAVE ON A TYPICAL DAY?: 1 OR 2

## 2020-06-04 SDOH — HEALTH STABILITY: MENTAL HEALTH: HOW OFTEN DO YOU HAVE A DRINK CONTAINING ALCOHOL?: 2-4 TIMES A MONTH

## 2020-06-04 ASSESSMENT — MIFFLIN-ST. JEOR: SCORE: 1508.41

## 2020-06-04 NOTE — PATIENT INSTRUCTIONS
I ordered a mammogram and ultra sound  You should receive a call to schedule, other you can call [phone] to schedule.

## 2020-06-04 NOTE — PROGRESS NOTES
Subjective     Sharon Nascimento is a 54 year old female who presents to clinic today for the following health issues:    HPI   Lump In Breast      Duration: 1 week ago    Description (location/character/radiation): lump in left breast    Intensity:  NA    Accompanying signs and symptoms: no pain, no nipple discharge    History (similar episodes/previous evaluation): None    Precipitating or alleviating factors: None    Therapies tried and outcome: None     She had a normal screening mammogram July 2019  No personal or family history of breast cancer  She has been on Prempro since 2016 for hot flashes and mood swings  Stopped taking approximately 3 weeks after initially forgetting to take, but once she remembered she decided not to restart it  Has not noticed any new/worsening symptoms  Went through menopause approximately 3 years ago    She noticed a small nontender lump in her left breast 1 week ago  Denies nipple discharge, pain, weight loss or night sweats    She is also requesting a refill of her Imitrex which she takes for migraines  Gets a migraine 2-3 times a month  Takes 100 mg at onset of headache and then usually repeats a dose 10-12 hours later  1-2 hours after taking Imitrex, the headache is less, but not resolved, and then seems to come back really strong about 10-12 hours later  Going through 7-8 tablets/month  Insurance only covers 10 tablets/month          Patient Active Problem List   Diagnosis     ADD (attention deficit disorder)     Migraine     Mood swings     CARDIOVASCULAR SCREENING; LDL GOAL LESS THAN 160     Vitamin D deficiency     Anxiety     Past Surgical History:   Procedure Laterality Date     LASIK         Social History     Tobacco Use     Smoking status: Never Smoker     Smokeless tobacco: Never Used   Substance Use Topics     Alcohol use: Yes     Frequency: 2-4 times a month     Drinks per session: 1 or 2     Comment: 2 times per month, 1-3 drinks per time     Family History   Problem  "Relation Age of Onset     Psychotic Disorder Daughter         ADD     Psychotic Disorder Brother         ADD     Osteoporosis Mother      Arthritis Mother      Cancer Father         throat           Reviewed and updated as needed this visit by Provider         Review of Systems   Constitutional, HEENT, cardiovascular, pulmonary, gi and gu systems are negative, except as otherwise noted.      Objective    /80 (BP Location: Right arm, Patient Position: Chair, Cuff Size: Adult Regular)   Pulse 79   Temp 97.8  F (36.6  C) (Oral)   Resp 12   Ht 1.708 m (5' 7.25\")   Wt 86.7 kg (191 lb 1.6 oz)   LMP 11/15/2015 (Approximate)   SpO2 98%   BMI 29.71 kg/m    Body mass index is 29.71 kg/m .  Physical Exam   GENERAL: healthy, alert and no distress  RESP: lungs clear to auscultation - no rales, rhonchi or wheezes  BREAST: normal without masses, tenderness or nipple discharge and no palpable axillary masses or adenopathy  CV: regular rate and rhythm, normal S1 S2, no S3 or S4, no murmur, click or rub, no peripheral edema and peripheral pulses strong    Diagnostic Test Results:  Labs reviewed in Epic        Assessment & Plan       ICD-10-CM    1. Breast mass  N63.0 MA Diagnostic Digital Bilateral     US Breast Left Complete 4 Quadrants   2. Migraine without aura and without status migrainosus, not intractable  G43.009 SUMAtriptan (IMITREX) 100 MG tablet   3. Mood swings  R45.86         BMI:   Estimated body mass index is 29.71 kg/m  as calculated from the following:    Height as of this encounter: 1.708 m (5' 7.25\").    Weight as of this encounter: 86.7 kg (191 lb 1.6 oz).           I could not detect a breast mass. What she detects as a mass appears to be more fibrous, but I will obtain a diagnostic mammogram and US for further review  Ok to stay off of Prempro. Monitor for return of symptoms  Advised that if she continues to have a migraine 2 hours after taking 1st dose of Imitrex, she should repeat a second dose. I " think she is under treating the migraine, and then it comes back stronger and is more difficult to treat       No follow-ups on file.    FARHAD Forte [facility] [place]

## 2020-08-31 DIAGNOSIS — N95.1 MENOPAUSAL SYNDROME (HOT FLASHES): ICD-10-CM

## 2020-08-31 NOTE — LETTER
September 11, 2020      Sharon Nascimento  [address]  [place] [address]        Dear Sharon,       We care about your health and have reviewed your health plan including your medical conditions, medications, and lab results.  Based on this review, it is recommended that you follow up regarding the following health topic(s):  -Medication follow up     Please call us at the [facility] - [phone] (or use [employer]) to address the above recommendations.     Thank you for trusting [facility] and we appreciate the opportunity to serve you.  We look forward to supporting your healthcare needs in the future.    Healthy Regards,    Your Health Care Team  [facility]

## 2020-09-01 RX ORDER — ESTROGEN,CON/M-PROGEST ACET 0.625-2.5
TABLET ORAL
Qty: 84 TABLET | Refills: 0 | OUTPATIENT
Start: 2020-09-01

## 2020-09-01 NOTE — TELEPHONE ENCOUNTER
MA/TC-  Please assist patient in scheduling a VV to restart taking PREMPRO. Thank you! Darby Zavala, RN on 9/1/2020 at 11:00 AM    Called pt. She started having hot flashed again. Would like to restart PREMPRO. She has 15 pills left. Advised of message from Jaquelin and she is willing to do VV. Darby Zavala RN on 9/1/2020 at 11:01 AM

## 2020-11-11 ENCOUNTER — ANCILLARY PROCEDURE (OUTPATIENT)
Dept: MAMMOGRAPHY | Facility: CLINIC | Age: 55
End: 2020-11-11
Payer: COMMERCIAL

## 2020-11-11 DIAGNOSIS — Z12.31 VISIT FOR SCREENING MAMMOGRAM: ICD-10-CM

## 2020-11-11 PROCEDURE — 77067 SCR MAMMO BI INCL CAD: CPT | Performed by: RADIOLOGY

## 2020-11-11 PROCEDURE — 77063 BREAST TOMOSYNTHESIS BI: CPT | Performed by: RADIOLOGY

## 2020-12-18 ENCOUNTER — VIRTUAL VISIT (OUTPATIENT)
Dept: PEDIATRICS | Facility: CLINIC | Age: 55
End: 2020-12-18
Payer: COMMERCIAL

## 2020-12-18 DIAGNOSIS — N95.1 MENOPAUSAL SYNDROME (HOT FLASHES): ICD-10-CM

## 2020-12-18 PROCEDURE — 99213 OFFICE O/P EST LOW 20 MIN: CPT | Mod: 95 | Performed by: NURSE PRACTITIONER

## 2020-12-18 RX ORDER — ESTROGEN,CON/M-PROGEST ACET 0.625-2.5
1 TABLET ORAL DAILY
Qty: 84 TABLET | Refills: 0 | Status: CANCELLED | OUTPATIENT
Start: 2020-12-18

## 2020-12-18 NOTE — PROGRESS NOTES
"Sharon Nascimento is a 55 year old female who is being evaluated via a billable telephone visit.      The patient has been notified of following:     \"This telephone visit will be conducted via a call between you and your physician/provider. We have found that certain health care needs can be provided without the need for a physical exam.  This service lets us provide the care you need with a short phone conversation.  If a prescription is necessary we can send it directly to your pharmacy.  If lab work is needed we can place an order for that and you can then stop by our lab to have the test done at a later time.    Telephone visits are billed at different rates depending on your insurance coverage. During this emergency period, for some insurers they may be billed the same as an in-person visit.  Please reach out to your insurance provider with any questions.    If during the course of the call the physician/provider feels a telephone visit is not appropriate, you will not be charged for this service.\"    Patient has given verbal consent for Telephone visit?  Yes    What phone number would you like to be contacted at? [phone]    How would you like to obtain your AVS? Lb Mascorro     Sharon Nascimento is a 55 year old female who presents via phone visit today for the following health issues:    HPI     Medication Followup of PREMPRO 0.625-2.5 MG tablet    Taking Medication as prescribed: yes    Side Effects:  None    Medication Helping Symptoms:  yes     She was started on Prempro 2016 for hot flashes and mood swings  I last saw her June of this year and she had been off of Prempro for approximately 3 weeks. Decided to try to stay off  She stayed off for another 3 weeks but then restarted because hot flashes had returned and she was not sleeping well  She had an old prescription and has been taking for months but requests a refill          Review of Systems   Constitutional, HEENT, cardiovascular, pulmonary, " "gi and gu systems are negative, except as otherwise noted.       Objective          Vitals:  No vitals were obtained today due to virtual visit.    healthy, alert and no distress  PSYCH: Alert and oriented times 3; coherent speech, normal   rate and volume, able to articulate logical thoughts, able   to abstract reason, no tangential thoughts, no hallucinations   or delusions  Her affect is normal and pleasant  RESP: No cough, no audible wheezing, able to talk in full sentences  Remainder of exam unable to be completed due to telephone visits    No results found for this or any previous visit (from the past 24 hour(s)).        Assessment/Plan:    Assessment & Plan     Menopausal syndrome (hot flashes)  - She had a normal mammogram earlier this year  - Prempro is expensive. Her copay is approximately $300. Discussed options to switch to generic similar (consider FemHRT), reduce dose, or switch to selective serotonin reuptake inhibitor or SNRI  - She does report a history of missing doses so paroxetine or venlafaxine might not be good options  - She elects to continue with Prempro but we will try a reduced dose to see if its equally effective but reduced any risks for SE or complications  - She can send [employer] message in future if requesting to change medication treatment plan as we reviewed her options today  - estrogen conj-medroxyPROGESTERone (PREMPRO) 0.45-1.5 MG tablet  Dispense: 90 tablet; Refill: 3       BMI:   Estimated body mass index is 29.71 kg/m  as calculated from the following:    Height as of 6/4/20: 1.708 m (5' 7.25\").    Weight as of 6/4/20: 86.7 kg (191 lb 1.6 oz).              Return in about 6 months (around 6/18/2021) for Physical Exam.    FARHAD Forte [facility] [place]    Phone call duration:  9 minutes              "

## 2021-11-03 ENCOUNTER — OFFICE VISIT (OUTPATIENT)
Dept: PEDIATRICS | Facility: CLINIC | Age: 56
End: 2021-11-03
Payer: COMMERCIAL

## 2021-11-03 VITALS
RESPIRATION RATE: 20 BRPM | DIASTOLIC BLOOD PRESSURE: 80 MMHG | SYSTOLIC BLOOD PRESSURE: 100 MMHG | BODY MASS INDEX: 30.25 KG/M2 | TEMPERATURE: 98.3 F | HEART RATE: 84 BPM | WEIGHT: 192.7 LBS | HEIGHT: 67 IN

## 2021-11-03 DIAGNOSIS — N39.41 URGENCY INCONTINENCE: ICD-10-CM

## 2021-11-03 DIAGNOSIS — E55.9 VITAMIN D DEFICIENCY: ICD-10-CM

## 2021-11-03 DIAGNOSIS — R35.0 URINARY FREQUENCY: ICD-10-CM

## 2021-11-03 DIAGNOSIS — Z12.4 CERVICAL CANCER SCREENING: ICD-10-CM

## 2021-11-03 DIAGNOSIS — Z12.11 COLON CANCER SCREENING: ICD-10-CM

## 2021-11-03 DIAGNOSIS — Z12.31 ENCOUNTER FOR SCREENING MAMMOGRAM FOR BREAST CANCER: ICD-10-CM

## 2021-11-03 DIAGNOSIS — K21.9 GASTROESOPHAGEAL REFLUX DISEASE WITHOUT ESOPHAGITIS: ICD-10-CM

## 2021-11-03 DIAGNOSIS — R31.29 MICROSCOPIC HEMATURIA: ICD-10-CM

## 2021-11-03 DIAGNOSIS — N95.1 MENOPAUSAL SYNDROME (HOT FLASHES): ICD-10-CM

## 2021-11-03 DIAGNOSIS — G43.009 MIGRAINE WITHOUT AURA AND WITHOUT STATUS MIGRAINOSUS, NOT INTRACTABLE: ICD-10-CM

## 2021-11-03 DIAGNOSIS — Z00.00 ROUTINE GENERAL MEDICAL EXAMINATION AT A HEALTH CARE FACILITY: Primary | ICD-10-CM

## 2021-11-03 LAB
ALBUMIN UR-MCNC: NEGATIVE MG/DL
APPEARANCE UR: CLEAR
BACTERIA #/AREA URNS HPF: ABNORMAL /HPF
BILIRUB UR QL STRIP: NEGATIVE
COLOR UR AUTO: YELLOW
GLUCOSE UR STRIP-MCNC: NEGATIVE MG/DL
HGB UR QL STRIP: ABNORMAL
KETONES UR STRIP-MCNC: ABNORMAL MG/DL
LEUKOCYTE ESTERASE UR QL STRIP: NEGATIVE
MUCOUS THREADS #/AREA URNS LPF: PRESENT /LPF
NITRATE UR QL: NEGATIVE
PH UR STRIP: 6 [PH] (ref 5–7)
RBC #/AREA URNS AUTO: ABNORMAL /HPF
SP GR UR STRIP: 1.02 (ref 1–1.03)
SQUAMOUS #/AREA URNS AUTO: ABNORMAL /LPF
UROBILINOGEN UR STRIP-ACNC: 0.2 E.U./DL
WBC #/AREA URNS AUTO: ABNORMAL /HPF

## 2021-11-03 PROCEDURE — 36415 COLL VENOUS BLD VENIPUNCTURE: CPT | Performed by: NURSE PRACTITIONER

## 2021-11-03 PROCEDURE — 87086 URINE CULTURE/COLONY COUNT: CPT | Performed by: NURSE PRACTITIONER

## 2021-11-03 PROCEDURE — 80048 BASIC METABOLIC PNL TOTAL CA: CPT | Performed by: NURSE PRACTITIONER

## 2021-11-03 PROCEDURE — 99396 PREV VISIT EST AGE 40-64: CPT | Performed by: NURSE PRACTITIONER

## 2021-11-03 PROCEDURE — 80061 LIPID PANEL: CPT | Performed by: NURSE PRACTITIONER

## 2021-11-03 PROCEDURE — G0145 SCR C/V CYTO,THINLAYER,RESCR: HCPCS | Performed by: NURSE PRACTITIONER

## 2021-11-03 PROCEDURE — 99213 OFFICE O/P EST LOW 20 MIN: CPT | Mod: 25 | Performed by: NURSE PRACTITIONER

## 2021-11-03 PROCEDURE — 81001 URINALYSIS AUTO W/SCOPE: CPT | Performed by: NURSE PRACTITIONER

## 2021-11-03 PROCEDURE — 82306 VITAMIN D 25 HYDROXY: CPT | Performed by: NURSE PRACTITIONER

## 2021-11-03 PROCEDURE — 87624 HPV HI-RISK TYP POOLED RSLT: CPT | Performed by: NURSE PRACTITIONER

## 2021-11-03 RX ORDER — SUMATRIPTAN 100 MG/1
TABLET, FILM COATED ORAL
Qty: 27 TABLET | Refills: 3 | Status: SHIPPED | OUTPATIENT
Start: 2021-11-03 | End: 2022-11-07

## 2021-11-03 RX ORDER — OMEPRAZOLE 40 MG/1
40 CAPSULE, DELAYED RELEASE ORAL DAILY
Qty: 30 CAPSULE | Refills: 1 | Status: SHIPPED | OUTPATIENT
Start: 2021-11-03 | End: 2022-03-31

## 2021-11-03 ASSESSMENT — ENCOUNTER SYMPTOMS
DIZZINESS: 0
DIARRHEA: 0
MYALGIAS: 0
FREQUENCY: 0
WEAKNESS: 0
ABDOMINAL PAIN: 0
COUGH: 0
SHORTNESS OF BREATH: 0
NERVOUS/ANXIOUS: 0
ARTHRALGIAS: 0
PALPITATIONS: 1
PARESTHESIAS: 0
DYSURIA: 0
CONSTIPATION: 0
HEADACHES: 1
FEVER: 0
JOINT SWELLING: 0
HEARTBURN: 1
HEMATURIA: 0
SORE THROAT: 0
CHILLS: 0
EYE PAIN: 0
HEMATOCHEZIA: 0
NAUSEA: 0
BREAST MASS: 0

## 2021-11-03 ASSESSMENT — MIFFLIN-ST. JEOR: SCORE: 1501.71

## 2021-11-03 NOTE — PROGRESS NOTES
SUBJECTIVE:   CC: Sharon Nascimento is an 56 year old woman who presents for preventive health visit.       Patient has been advised of split billing requirements and indicates understanding: Yes  Healthy Habits:     Getting at least 3 servings of Calcium per day:  NO    Bi-annual eye exam:  Yes    Dental care twice a year:  Yes    Sleep apnea or symptoms of sleep apnea:  None    Diet:  Breakfast skipped    Frequency of exercise:  2-3 days/week    Duration of exercise:  30-45 minutes    Taking medications regularly:  Yes    Barriers to taking medications:  None    Medication side effects:  None    PHQ-2 Total Score: 0    Additional concerns today:  No      She tried weaning off of Prempro last year  Hot flashes returned so she restarted  We reduced to lower dose  Doing well  May miss doses frequently    Some urge incontinence for past few years  Worsening  Wears a pad  No dysuria    Migraines 1-2/month   Migraine can last a few days    Having a lot of heart burn  Better since stopping pop but still has heart burn  Takes prilosec daily over the counter but still taking TUMS for breakthrough      Today's PHQ-2 Score: 0  PHQ-2 ( 1999 Pf[employer]) 11/3/2021   Q1: Little interest or pleasure in doing things 0   Q2: Feeling down, depressed or hopeless 0   PHQ-2 Score 0   Q1: Little interest or pleasure in doing things Not at all   Q2: Feeling down, depressed or hopeless Not at all   PHQ-2 Score 0       Abuse: Current or Past (Physical, Sexual or Emotional) - No  Do you feel safe in your environment? Yes    Have you ever done Advance Care Planning? (For example, a Health Directive, POLST, or a discussion with a medical provider or your loved ones about your wishes): No, advance care planning information given to patient to review.  Patient declined advance care planning discussion at this time.    Social History     Tobacco Use     Smoking status: Never Smoker     Smokeless tobacco: Never Used   Substance Use Topics     Alcohol  use: Yes     Comment: 2 times per month, 1-3 drinks per time     If you drink alcohol do you typically have >3 drinks per day or >7 drinks per week? No    Alcohol Use 11/3/2021   Prescreen: >3 drinks/day or >7 drinks/week? No   Prescreen: >3 drinks/day or >7 drinks/week? -   No flowsheet data found.    Reviewed orders with patient.  Reviewed health maintenance and updated orders accordingly - Yes  Lab work is in process  Labs reviewed in EPIC  BP Readings from Last 3 Encounters:   11/03/21 100/80   06/04/20 126/80   02/27/19 98/60    Wt Readings from Last 3 Encounters:   11/03/21 87.4 kg (192 lb 11.2 oz)   06/04/20 86.7 kg (191 lb 1.6 oz)   02/27/19 85.5 kg (188 lb 8 oz)                  Patient Active Problem List   Diagnosis     ADD (attention deficit disorder)     Migraine     Mood swings     CARDIOVASCULAR SCREENING; LDL GOAL LESS THAN 160     Vitamin D deficiency     Anxiety     Past Surgical History:   Procedure Laterality Date     LASIK         Social History     Tobacco Use     Smoking status: Never Smoker     Smokeless tobacco: Never Used   Substance Use Topics     Alcohol use: Yes     Comment: 2 times per month, 1-3 drinks per time     Family History   Problem Relation Age of Onset     Psychotic Disorder Daughter         ADD     Psychotic Disorder Brother         ADD     Osteoporosis Mother      Arthritis Mother      Cancer Father         throat         Current Outpatient Medications   Medication Sig Dispense Refill     estrogen conj-medroxyPROGESTERone (PREMPRO) 0.45-1.5 MG tablet Take 1 tablet by mouth daily 90 tablet 3     omeprazole (PRILOSEC) 40 MG DR capsule Take 1 capsule (40 mg) by mouth daily 30 capsule 1     SUMAtriptan (IMITREX) 100 MG tablet Take 1 tablet atTAKE 1 TABLET BY MOUTH AT ONSET OF MIGRAINE. MAY REPEAST IN 2 HRS IF NEEDED. MAX 2 TABLETS PER 24HR 27 tablet 3       Breast Cancer Screening:  Any new diagnosis of family breast, ovarian, or bowel cancer? No    FHS-7: No flowsheet data  "found.  click delete button to remove this line now  Mammogram Screening: Recommended annual mammography  Pertinent mammograms are reviewed under the imaging tab.    History of abnormal Pap smear: NO - age 30-65 PAP every 5 years with negative HPV co-testing recommended  PAP / HPV Latest Ref Rng & Units 10/23/2015 4/13/2012 12/8/2010   PAP (Historical) - NIL NIL OTHER-NIL EM>40   HPV16 NEG Negative - -   HPV18 NEG Negative - -   HRHPV NEG Negative - -     Reviewed and updated as needed this visit by clinical staff   Allergies  Meds              Reviewed and updated as needed this visit by Provider                Past Medical History:   Diagnosis Date     ADD (attention deficit disorder)      Migraine, unspecified, without mention of intractable migraine without mention of status migrainosus      Vitamin D deficiency         Review of Systems   Constitutional: Negative for chills and fever.   HENT: Negative for congestion, ear pain, hearing loss and sore throat.    Eyes: Negative for pain and visual disturbance.   Respiratory: Negative for cough and shortness of breath.    Cardiovascular: Positive for palpitations. Negative for chest pain and peripheral edema.   Gastrointestinal: Positive for heartburn. Negative for abdominal pain, constipation, diarrhea, hematochezia and nausea.   Breasts:  Negative for tenderness, breast mass and discharge.   Genitourinary: Positive for urgency. Negative for dysuria, frequency, genital sores, hematuria, pelvic pain, vaginal bleeding and vaginal discharge.   Musculoskeletal: Negative for arthralgias, joint swelling and myalgias.   Skin: Negative for rash.   Neurological: Positive for headaches. Negative for dizziness, weakness and paresthesias.   Psychiatric/Behavioral: Negative for mood changes. The patient is not nervous/anxious.           OBJECTIVE:   /80   Pulse 84   Temp 98.3  F (36.8  C) (Oral)   Resp 20   Ht 1.702 m (5' 7\")   Wt 87.4 kg (192 lb 11.2 oz)   LMP " 11/15/2015 (Approximate)   BMI 30.18 kg/m    Physical Exam  GENERAL: healthy, alert and no distress  EYES: Eyes grossly normal to inspection, PERRL and conjunctivae and sclerae normal  HENT: ear canals and TM's normal, nose and mouth without ulcers or lesions  NECK: no adenopathy, no asymmetry, masses, or scars and thyroid normal to palpation  RESP: lungs clear to auscultation - no rales, rhonchi or wheezes  CV: regular rate and rhythm, normal S1 S2, no S3 or S4, no murmur, click or rub, no peripheral edema and peripheral pulses strong  ABDOMEN: soft, nontender, no hepatosplenomegaly, no masses and bowel sounds normal   (female): normal female external genitalia, normal urethral meatus, vaginal mucosa pink, moist, well rugated, and normal cervix  MS: no gross musculoskeletal defects noted, no edema  SKIN: no suspicious lesions or rashes  NEURO: Normal strength and tone, mentation intact and speech normal  PSYCH: mentation appears normal, affect normal/bright    Diagnostic Test Results:  Labs reviewed in Epic  Results for orders placed or performed in visit on 11/03/21 (from the past 24 hour(s))   UA Macro with Reflex to Micro and Culture - lab collect    Specimen: Urine, Clean Catch   Result Value Ref Range    Color Urine Yellow Colorless, Straw, Light Yellow, Yellow    Appearance Urine Clear Clear    Glucose Urine Negative Negative mg/dL    Bilirubin Urine Negative Negative    Ketones Urine Trace (A) Negative mg/dL    Specific Gravity Urine 1.025 1.003 - 1.035    Blood Urine Small (A) Negative    pH Urine 6.0 5.0 - 7.0    Protein Albumin Urine Negative Negative mg/dL    Urobilinogen Urine 0.2 0.2, 1.0 E.U./dL    Nitrite Urine Negative Negative    Leukocyte Esterase Urine Negative Negative   Urine Microscopic   Result Value Ref Range    Bacteria Urine Moderate (A) None Seen /HPF    RBC Urine 5-10 (A) 0-2 /HPF /HPF    WBC Urine 0-5 0-5 /HPF /HPF    Squamous Epithelials Urine Few (A) None Seen /LPF    Mucus Urine  Present (A) None Seen /LPF    Narrative    Urine Culture not indicated       ASSESSMENT/PLAN:   (Z00.00) Routine general medical examination at a health care facility  (primary encounter diagnosis)  Plan: Lipid panel reflex to direct LDL Fasting, Basic        metabolic panel  (Ca, Cl, CO2, Creat, Gluc, K,         Na, BUN)      (G43.009) Migraine without aura and without status migrainosus, not intractable  Comment: Discussed taking at first onset of migraine and repeating in 2 hours if migraine persists. Migraines infrequent but tend to last a few days  Plan: SUMAtriptan (IMITREX) 100 MG tablet           (R35.0) Urinary frequency  Comment: UA negative for infection but does show microscopic hematuria. Will send for culture and have her repeat UA in 2 weeks. If hematuria persists, needs to see urology  Plan: UA Macro with Reflex to Micro and Culture - lab        collect, [facility] PT and Hand Referral, Urine         Microscopic           (N95.1) Menopausal syndrome (hot flashes)  Comment: Reviewed risks and benefits. Will continue  Plan: estrogen conj-medroxyPROGESTERone (PREMPRO)         0.45-1.5 MG tablet           (Z12.31) Encounter for screening mammogram for breast cancer  Plan: MA Screen Bilateral w/Rick    (Z12.4) Cervical cancer screening  Plan: Pap Screen with HPV - recommended age 30 - 65         years    (K21.9) Gastroesophageal reflux disease without esophagitis  Comment: She is having refractory GERD despite omeprazole 20 mg daily. Will increase to 40 mg daily and obtain endoscopy along with colonoscopy  Plan: Adult Gastro Ref - Procedure Only, omeprazole         (PRILOSEC) 40 MG DR capsule            (Z12.11) Colon cancer screening  Plan: Adult Gastro Ref - Procedure Only    (N39.41) Urgency incontinence  Plan: [facility] PT and Hand Referral           (E55.9) Vitamin D deficiency  Plan: Vitamin D Deficiency    (R31.29) Microscopic hematuria  Plan: UA with Microscopic reflex to Culture - lab         collect, Urine  "Culture Aerobic Bacterial - lab         collect      Patient has been advised of split billing requirements and indicates understanding: Yes  COUNSELING:  Reviewed preventive health counseling, as reflected in patient instructions       Regular exercise       Healthy diet/nutrition    Estimated body mass index is 29.71 kg/m  as calculated from the following:    Height as of 6/4/20: 1.708 m (5' 7.25\").    Weight as of 6/4/20: 86.7 kg (191 lb 1.6 oz).        She reports that she has never smoked. She has never used smokeless tobacco.      Counseling Resources:  ATP IV Guidelines  Pooled Cohorts Equation Calculator  Breast Cancer Risk Calculator  BRCA-Related Cancer Risk Assessment: FHS-7 Tool  FRAX Risk Assessment  ICSI Preventive Guidelines  Dietary Guidelines for Americans, 2010  USDA's MyPlate  ASA Prophylaxis  Lung CA Screening    FARHAD Forte CNP  [facility]    "

## 2021-11-03 NOTE — PATIENT INSTRUCTIONS
You can get the Shingrix vaccine at any time    Imitrex is more effective the sooner you take it at the onset of a migraine  I recommend taking 100 mg right away    I sent in a higher strength Prilosec. You are currently taking 20 mg which you buy over the counter. I increased you to 40 mg. You should take this first thing in the morning on an empty stomach    You are due for your colonoscopy. I've also ordered an endoscopy because of your persistent heart burn    I recommend taking a daily vitamin D which you can buy over the counter. I recommend 2000 international unit(s) daily. If your vitamin D is low, I will send in a high dose to take for 8 weeks.     Preventive Health Recommendations  Female Ages 50 - 64    Yearly exam: See your health care provider every year in order to  o Review health changes.   o Discuss preventive care.    o Review your medicines if your doctor has prescribed any.      Get a Pap test every three years (unless you have an abnormal result and your provider advises testing more often).    If you get Pap tests with HPV test, you only need to test every 5 years, unless you have an abnormal result.     You do not need a Pap test if your uterus was removed (hysterectomy) and you have not had cancer.    You should be tested each year for STDs (sexually transmitted diseases) if you're at risk.     Have a mammogram every 1 to 2 years.    Have a colonoscopy at age 50, or have a yearly FIT test (stool test). These exams screen for colon cancer.      Have a cholesterol test every 5 years, or more often if advised.    Have a diabetes test (fasting glucose) every three years. If you are at risk for diabetes, you should have this test more often.     If you are at risk for osteoporosis (brittle bone disease), think about having a bone density scan (DEXA).    Shots: Get a flu shot each year. Get a tetanus shot every 10 years.    Nutrition:     Eat at least 5 servings of fruits and vegetables each  day.    Eat whole-grain bread, whole-wheat pasta and brown rice instead of white grains and rice.    Get adequate Calcium and Vitamin D.     Lifestyle    Exercise at least 150 minutes a week (30 minutes a day, 5 days a week). This will help you control your weight and prevent disease.    Limit alcohol to one drink per day.    No smoking.     Wear sunscreen to prevent skin cancer.     See your dentist every six months for an exam and cleaning.    See your eye doctor every 1 to 2 years.

## 2021-11-04 LAB
ANION GAP SERPL CALCULATED.3IONS-SCNC: 5 MMOL/L (ref 3–14)
BACTERIA UR CULT: NORMAL
BUN SERPL-MCNC: 10 MG/DL (ref 7–30)
CALCIUM SERPL-MCNC: 9 MG/DL (ref 8.5–10.1)
CHLORIDE BLD-SCNC: 108 MMOL/L (ref 94–109)
CHOLEST SERPL-MCNC: 240 MG/DL
CO2 SERPL-SCNC: 24 MMOL/L (ref 20–32)
CREAT SERPL-MCNC: 0.86 MG/DL (ref 0.52–1.04)
DEPRECATED CALCIDIOL+CALCIFEROL SERPL-MC: 19 UG/L (ref 20–75)
FASTING STATUS PATIENT QL REPORTED: YES
GFR SERPL CREATININE-BSD FRML MDRD: 76 ML/MIN/1.73M2
GLUCOSE BLD-MCNC: 86 MG/DL (ref 70–99)
HDLC SERPL-MCNC: 43 MG/DL
LDLC SERPL CALC-MCNC: 148 MG/DL
NONHDLC SERPL-MCNC: 197 MG/DL
POTASSIUM BLD-SCNC: 4 MMOL/L (ref 3.4–5.3)
SODIUM SERPL-SCNC: 137 MMOL/L (ref 133–144)
TRIGL SERPL-MCNC: 243 MG/DL

## 2021-11-05 DIAGNOSIS — E55.9 VITAMIN D DEFICIENCY: Primary | ICD-10-CM

## 2021-11-05 LAB
BKR LAB AP GYN ADEQUACY: NORMAL
BKR LAB AP GYN INTERPRETATION: NORMAL
BKR LAB AP HPV REFLEX: NORMAL
BKR LAB AP PREVIOUS ABNORMAL: NORMAL
PATH REPORT.COMMENTS IMP SPEC: NORMAL
PATH REPORT.RELEVANT HX SPEC: NORMAL

## 2021-11-08 LAB
HUMAN PAPILLOMA VIRUS 16 DNA: NEGATIVE
HUMAN PAPILLOMA VIRUS 18 DNA: NEGATIVE
HUMAN PAPILLOMA VIRUS FINAL DIAGNOSIS: NORMAL
HUMAN PAPILLOMA VIRUS OTHER HR: NEGATIVE

## 2021-11-30 DIAGNOSIS — K21.9 GASTROESOPHAGEAL REFLUX DISEASE WITHOUT ESOPHAGITIS: ICD-10-CM

## 2021-11-30 RX ORDER — OMEPRAZOLE 40 MG/1
CAPSULE, DELAYED RELEASE ORAL
Qty: 30 CAPSULE | Refills: 1 | OUTPATIENT
Start: 2021-11-30

## 2021-11-30 NOTE — TELEPHONE ENCOUNTER
Already approved. omeprazole (PRILOSEC) 40 MG DR capsule 30 capsule 1 refill 11/3/2021   Michaelle Wells RN

## 2021-12-06 ENCOUNTER — ANCILLARY PROCEDURE (OUTPATIENT)
Dept: MAMMOGRAPHY | Facility: CLINIC | Age: 56
End: 2021-12-06
Payer: COMMERCIAL

## 2021-12-06 DIAGNOSIS — Z12.31 ENCOUNTER FOR SCREENING MAMMOGRAM FOR BREAST CANCER: ICD-10-CM

## 2021-12-06 PROCEDURE — 77063 BREAST TOMOSYNTHESIS BI: CPT | Mod: TC | Performed by: RADIOLOGY

## 2021-12-06 PROCEDURE — 77067 SCR MAMMO BI INCL CAD: CPT | Mod: TC | Performed by: RADIOLOGY

## 2022-02-09 ENCOUNTER — THERAPY VISIT (OUTPATIENT)
Dept: PHYSICAL THERAPY | Facility: CLINIC | Age: 57
End: 2022-02-09
Attending: NURSE PRACTITIONER
Payer: COMMERCIAL

## 2022-02-09 DIAGNOSIS — N39.41 URGENCY INCONTINENCE: ICD-10-CM

## 2022-02-09 DIAGNOSIS — R35.0 URINARY FREQUENCY: ICD-10-CM

## 2022-02-09 PROCEDURE — 97161 PT EVAL LOW COMPLEX 20 MIN: CPT | Mod: GP | Performed by: PHYSICAL THERAPIST

## 2022-02-09 PROCEDURE — 97110 THERAPEUTIC EXERCISES: CPT | Mod: GP | Performed by: PHYSICAL THERAPIST

## 2022-02-09 PROCEDURE — 97535 SELF CARE MNGMENT TRAINING: CPT | Mod: GP | Performed by: PHYSICAL THERAPIST

## 2022-02-09 NOTE — PROGRESS NOTES
Physical Therapy Initial Evaluation  Subjective:  SUBJECTIVE:  Patient reports onset of symptoms referred to PT 11/03/21, onset many years ago.Symptoms include urinary urge/frequency/urge incontinence.  Since onset symptoms have been getting better, worse or staying the same? Same over time Goals are to get better control and not leakage.     None     Urination:  Do you leak on the way to the bathroom or with a strong urge to void? Yes    Do you leak with cough,sneeze, jumping, running?No   Any other activities that cause leaking? Yes, being close to home at end of walk, end of exercise class, drinking water  Do you have triggers that make you feel you can't wait to go to the bathroom? Yes   what are they end of exercise.  Type of pad and number used per day? 1-2 day  When you leak what is the amount? Medium, occasionally large  Voiding is variable. Some days every 20 minutes, some days 2-3 hours.  How long can you delay the need to urinate? Not at all.   How many times do you get up to urinate at night? 1-2x night    Can you stop the flow of urine when on the toilet? No  Is the volume of urine passed usually:small to medium. (8sec rule=  250ml with average bladder storing  400-600ml)    Do you strain to pass urine? No  Do you have a slow or hesitant urinary stream? No  Do you have difficulty initiating the urine stream? No  Is urination painful?  No    How many bladder infections have you had in last 12 months?0    Fluid intake(one glass is 8oz or one cup) 6-8 glasses/day, 1 caffinated glasses/day  0 alcohol glasses/day.    Bowel habits:  Frequency of bowel movements? 7 times a week  Consistancy of stool? soft formed, [place] Stool Scale NA  Do you ignore the urge to defecate? No  Do you strain to pass stool? No  Occasional urge with stool as well.    Pelvic Pain:  Do you have any pelvic pain with intercourse, exams, use of tampons? No  Is initial penetration during intercourse painful? No  Is deeper penetration  painful? No  Do you use lubricant?   Yes What kind? NA      Given birth? Yes Any complications? No  # of vaginal delieveries?3   # of C-sections?0  # of episiotomies?yes.  Are you sexually active?Yes  Have you ever been worried for your physical safety? No   Any abdominal or pelvic surgeries? no  Are you having any regular exercise?no  Have you practiced the PF(kegel) exercises for 4 or more weeks?no      The history is provided by the patient.   Patient Health History           General health as reported by patient is good.  Pertinent medical history includes: none.     Medical allergies: none.   Surgeries include:  None.    Current medications:  None.    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                                       Objective:  System                                 Pelvic Dysfunction Evaluation:    Bladder/Pelvic Problems:    Storage Problem:  Urgency, frequency and urge incontinence        Diagnostic Tests:    Pelvic Exam:  Yes                      Flexibility:  normal      Abdominal Wall:  normal        Pelvic Clock Exam:    Ischiocavernosis pain:  -  Bulbocavernosis pain:  -  Transverse Perineal:  -  Levator ANI:  -      Reflex Testing:  normal    External Assessment:        Bearing Down/Coughing:  Normal  Tissue Symmetry:  Normal  Introitus:  Normal  Muscle Contraction/Perineal Mobility:  Slight lift, no urogential triangle descent  Internal Assessment:  Internal assessment pelvic: 2/5 kegel, slight delayed relaxation.    Contraction/Grade:  Weak squeeze, 2 second hold (2)          Additional History:  Delivery History:  Vaginal delivery  Number of Pregnancies: 3  Number of Live Births: 3  Caffeine Consumption:  1                     General     ROS    Assessment/Plan:    Patient is a 56 year old female with pelvic complaints.    Patient has the following significant findings with corresponding treatment plan.                Diagnosis 1:  Urinary urge incontinence/urgency/fecal incontinence   Decreased strength - therapeutic exercise and therapeutic activities  Decreased proprioception - neuro re-education and therapeutic activities  Impaired gait - gait training  Impaired muscle performance - biofeedback and neuro re-education  Decreased function - therapeutic activities  Impaired posture - neuro re-education    Therapy Evaluation Codes:   1) History comprised of:   Personal factors that impact the plan of care:      Time since onset of symptoms.    Comorbidity factors that impact the plan of care are:      None.     Medications impacting care: None.  2) Examination of Body Systems comprised of:   Body structures and functions that impact the plan of care:      Pelvis.   Activity limitations that impact the plan of care are:      Fecal incontinence, Frequency, Urgency and Urge incontinence.  3) Clinical presentation characteristics are:   Stable/Uncomplicated.  4) Decision-Making    Low complexity using standardized patient assessment instrument and/or measureable assessment of functional outcome.  Cumulative Therapy Evaluation is: Low complexity.    Previous and current functional limitations:  (See Goal Flow Sheet for this information)    Short term and Long term goals: (See Goal Flow Sheet for this information)     Communication ability:  Patient appears to be able to clearly communicate and understand verbal and written communication and follow directions correctly.  Treatment Explanation - The following has been discussed with the patient:   RX ordered/plan of care  Anticipated outcomes  Possible risks and side effects  This patient would benefit from PT intervention to resume normal activities.   Rehab potential is good.    Frequency:  2 X a month, once daily  Duration:  for 3 months  Discharge Plan:  Achieve all LTG.  Independent in home treatment program.  Reach maximal therapeutic benefit.    Please refer to the daily flowsheet for treatment today, total treatment time and time spent performing  1:1 timed codes.

## 2022-03-30 DIAGNOSIS — K21.9 GASTROESOPHAGEAL REFLUX DISEASE WITHOUT ESOPHAGITIS: ICD-10-CM

## 2022-03-31 RX ORDER — OMEPRAZOLE 40 MG/1
CAPSULE, DELAYED RELEASE ORAL
Qty: 30 CAPSULE | Refills: 1 | Status: SHIPPED | OUTPATIENT
Start: 2022-03-31

## 2022-04-13 ENCOUNTER — THERAPY VISIT (OUTPATIENT)
Dept: PHYSICAL THERAPY | Facility: CLINIC | Age: 57
End: 2022-04-13
Payer: COMMERCIAL

## 2022-04-13 DIAGNOSIS — R35.0 URINARY FREQUENCY: Primary | ICD-10-CM

## 2022-04-13 DIAGNOSIS — N39.41 URGENCY INCONTINENCE: ICD-10-CM

## 2022-04-13 PROCEDURE — 97535 SELF CARE MNGMENT TRAINING: CPT | Mod: GP | Performed by: PHYSICAL THERAPIST

## 2022-04-13 PROCEDURE — 97112 NEUROMUSCULAR REEDUCATION: CPT | Mod: GP | Performed by: PHYSICAL THERAPIST

## 2022-04-13 PROCEDURE — 97110 THERAPEUTIC EXERCISES: CPT | Mod: GP | Performed by: PHYSICAL THERAPIST
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
## 2022-04-25 ENCOUNTER — THERAPY VISIT (OUTPATIENT)
Dept: PHYSICAL THERAPY | Facility: CLINIC | Age: 57
End: 2022-04-25
Payer: COMMERCIAL

## 2022-04-25 DIAGNOSIS — R35.0 URINARY FREQUENCY: Primary | ICD-10-CM

## 2022-04-25 DIAGNOSIS — N39.41 URGENCY INCONTINENCE: ICD-10-CM

## 2022-04-25 PROCEDURE — 97112 NEUROMUSCULAR REEDUCATION: CPT | Mod: GP | Performed by: PHYSICAL THERAPIST

## 2022-04-25 PROCEDURE — 97110 THERAPEUTIC EXERCISES: CPT | Mod: GP | Performed by: PHYSICAL THERAPIST

## 2022-07-13 PROBLEM — N39.41 URGENCY INCONTINENCE: Status: RESOLVED | Noted: 2022-02-09 | Resolved: 2022-07-13

## 2022-07-13 PROBLEM — R35.0 URINARY FREQUENCY: Status: RESOLVED | Noted: 2022-02-09 | Resolved: 2022-07-13

## 2022-07-27 ENCOUNTER — TRANSFERRED RECORDS (OUTPATIENT)
Dept: HEALTH INFORMATION MANAGEMENT | Facility: CLINIC | Age: 57
End: 2022-07-27

## 2023-07-05 ENCOUNTER — TELEPHONE (OUTPATIENT)
Dept: PEDIATRICS | Facility: CLINIC | Age: 58
End: 2023-07-05
Payer: COMMERCIAL

## 2023-07-05 DIAGNOSIS — E55.9 VITAMIN D DEFICIENCY: Primary | ICD-10-CM

## 2023-07-05 DIAGNOSIS — E78.2 MIXED HYPERLIPIDEMIA: ICD-10-CM

## 2023-07-05 NOTE — TELEPHONE ENCOUNTER
Order/Referral Request    Who is requesting: patient    Orders being requested: annual labs from [facility]    Reason service is needed/diagnosis: annual    When are orders needed by: 9/5/23    Has this been discussed with Provider: No    Does patient have a preference on a Group/Provider/Facility? [facility]    Does patient have an appointment scheduled?: Yes: 9/5 labs 9/7 annual visit     Where to send orders: Place orders within Epic    Could we send this information to you in [facility] or would you prefer to receive a phone call?:   Patient would prefer a phone call   Okay to leave a detailed message?: Yes at Home number on file [phone] (home)

## 2023-11-07 ENCOUNTER — LAB (OUTPATIENT)
Dept: LAB | Facility: CLINIC | Age: 58
End: 2023-11-07
Payer: COMMERCIAL

## 2023-11-07 DIAGNOSIS — E78.2 MIXED HYPERLIPIDEMIA: ICD-10-CM

## 2023-11-07 DIAGNOSIS — R25.2 LEG CRAMPS: ICD-10-CM

## 2023-11-07 DIAGNOSIS — E55.9 VITAMIN D DEFICIENCY: ICD-10-CM

## 2023-11-07 LAB
ALBUMIN SERPL BCG-MCNC: 4.3 G/DL (ref 3.5–5.2)
ALP SERPL-CCNC: 66 U/L (ref 35–104)
ALT SERPL W P-5'-P-CCNC: 20 U/L (ref 0–50)
ANION GAP SERPL CALCULATED.3IONS-SCNC: 10 MMOL/L (ref 7–15)
AST SERPL W P-5'-P-CCNC: 23 U/L (ref 0–45)
BILIRUB SERPL-MCNC: 0.5 MG/DL
BUN SERPL-MCNC: 12 MG/DL (ref 6–20)
CALCIUM SERPL-MCNC: 9.6 MG/DL (ref 8.6–10)
CHLORIDE SERPL-SCNC: 104 MMOL/L (ref 98–107)
CHOLEST SERPL-MCNC: 246 MG/DL
CREAT SERPL-MCNC: 0.84 MG/DL (ref 0.51–0.95)
DEPRECATED HCO3 PLAS-SCNC: 25 MMOL/L (ref 22–29)
EGFRCR SERPLBLD CKD-EPI 2021: 81 ML/MIN/1.73M2
GLUCOSE SERPL-MCNC: 90 MG/DL (ref 70–99)
HDLC SERPL-MCNC: 44 MG/DL
LDLC SERPL CALC-MCNC: 157 MG/DL
NONHDLC SERPL-MCNC: 202 MG/DL
POTASSIUM SERPL-SCNC: 4 MMOL/L (ref 3.4–5.3)
PROT SERPL-MCNC: 7.5 G/DL (ref 6.4–8.3)
SODIUM SERPL-SCNC: 139 MMOL/L (ref 135–145)
TRIGL SERPL-MCNC: 226 MG/DL
VIT D+METAB SERPL-MCNC: 19 NG/ML (ref 20–50)

## 2023-11-07 PROCEDURE — 80053 COMPREHEN METABOLIC PANEL: CPT

## 2023-11-07 PROCEDURE — 36415 COLL VENOUS BLD VENIPUNCTURE: CPT

## 2023-11-07 PROCEDURE — 82728 ASSAY OF FERRITIN: CPT

## 2023-11-07 PROCEDURE — 82306 VITAMIN D 25 HYDROXY: CPT

## 2023-11-07 PROCEDURE — 80061 LIPID PANEL: CPT

## 2023-11-08 ASSESSMENT — ENCOUNTER SYMPTOMS
DIZZINESS: 0
FEVER: 0
WEAKNESS: 0
FREQUENCY: 0
DIARRHEA: 0
HEMATOCHEZIA: 0
ARTHRALGIAS: 0
SHORTNESS OF BREATH: 0
CHILLS: 0
HEADACHES: 1
EYE PAIN: 0
MYALGIAS: 0
DYSURIA: 0
HEMATURIA: 0
NAUSEA: 0
CONSTIPATION: 0
BREAST MASS: 0
PARESTHESIAS: 0
ABDOMINAL PAIN: 0
HEARTBURN: 1
PALPITATIONS: 0
JOINT SWELLING: 0
COUGH: 0
NERVOUS/ANXIOUS: 0
SORE THROAT: 0

## 2023-11-09 ENCOUNTER — OFFICE VISIT (OUTPATIENT)
Dept: PEDIATRICS | Facility: CLINIC | Age: 58
End: 2023-11-09
Payer: COMMERCIAL

## 2023-11-09 VITALS
SYSTOLIC BLOOD PRESSURE: 133 MMHG | BODY MASS INDEX: 30.49 KG/M2 | HEIGHT: 67 IN | HEART RATE: 71 BPM | DIASTOLIC BLOOD PRESSURE: 93 MMHG | TEMPERATURE: 97.8 F | OXYGEN SATURATION: 100 % | RESPIRATION RATE: 20 BRPM | WEIGHT: 194.3 LBS

## 2023-11-09 DIAGNOSIS — Z00.00 ROUTINE GENERAL MEDICAL EXAMINATION AT A HEALTH CARE FACILITY: Primary | ICD-10-CM

## 2023-11-09 DIAGNOSIS — Z12.31 VISIT FOR SCREENING MAMMOGRAM: ICD-10-CM

## 2023-11-09 DIAGNOSIS — G43.009 MIGRAINE WITHOUT AURA AND WITHOUT STATUS MIGRAINOSUS, NOT INTRACTABLE: ICD-10-CM

## 2023-11-09 DIAGNOSIS — R25.2 LEG CRAMPS: ICD-10-CM

## 2023-11-09 DIAGNOSIS — Z86.39 HISTORY OF NON ANEMIC VITAMIN B12 DEFICIENCY: ICD-10-CM

## 2023-11-09 DIAGNOSIS — E66.811 CLASS 1 OBESITY DUE TO EXCESS CALORIES WITHOUT SERIOUS COMORBIDITY WITH BODY MASS INDEX (BMI) OF 30.0 TO 30.9 IN ADULT: ICD-10-CM

## 2023-11-09 DIAGNOSIS — E66.09 CLASS 1 OBESITY DUE TO EXCESS CALORIES WITHOUT SERIOUS COMORBIDITY WITH BODY MASS INDEX (BMI) OF 30.0 TO 30.9 IN ADULT: ICD-10-CM

## 2023-11-09 PROBLEM — G43.909 MIGRAINE HEADACHE: Status: ACTIVE | Noted: 2022-04-26

## 2023-11-09 PROBLEM — E78.6 LIPOPROTEIN DEFICIENCY: Status: ACTIVE | Noted: 2022-04-27

## 2023-11-09 PROBLEM — E78.2 MIXED HYPERLIPIDEMIA: Status: ACTIVE | Noted: 2022-04-27

## 2023-11-09 LAB — FERRITIN SERPL-MCNC: 60 NG/ML (ref 11–328)

## 2023-11-09 PROCEDURE — 99396 PREV VISIT EST AGE 40-64: CPT | Mod: GC

## 2023-11-09 PROCEDURE — 99214 OFFICE O/P EST MOD 30 MIN: CPT | Mod: 25

## 2023-11-09 RX ORDER — TOPIRAMATE 25 MG/1
TABLET, FILM COATED ORAL
Qty: 134 TABLET | Refills: 0 | Status: SHIPPED | OUTPATIENT
Start: 2023-11-09 | End: 2023-12-21

## 2023-11-09 RX ORDER — ATORVASTATIN CALCIUM 20 MG/1
1 TABLET, FILM COATED ORAL DAILY
COMMUNITY
Start: 2023-08-08 | End: 2023-11-09

## 2023-11-09 RX ORDER — SUMATRIPTAN 100 MG/1
TABLET, FILM COATED ORAL
Qty: 27 TABLET | Refills: 0 | Status: SHIPPED | OUTPATIENT
Start: 2023-11-09 | End: 2023-12-21

## 2023-11-09 RX ORDER — ESTRADIOL 0.04 MG/D
PATCH, EXTENDED RELEASE TRANSDERMAL
COMMUNITY
Start: 2022-09-29 | End: 2023-11-09

## 2023-11-09 RX ORDER — PROGESTERONE 100 MG/1
100 CAPSULE ORAL
COMMUNITY
Start: 2022-04-28 | End: 2023-11-09

## 2023-11-09 ASSESSMENT — ENCOUNTER SYMPTOMS
JOINT SWELLING: 0
FEVER: 0
EYE PAIN: 0
PARESTHESIAS: 0
SHORTNESS OF BREATH: 0
BREAST MASS: 0
MYALGIAS: 0
HEADACHES: 1
ABDOMINAL PAIN: 0
PALPITATIONS: 0
HEMATURIA: 0
DIZZINESS: 0
ARTHRALGIAS: 0
FREQUENCY: 0
HEMATOCHEZIA: 0
HEARTBURN: 1
CONSTIPATION: 0
SORE THROAT: 0
DIARRHEA: 0
COUGH: 0
NERVOUS/ANXIOUS: 0
DYSURIA: 0
WEAKNESS: 0
NAUSEA: 0
CHILLS: 0

## 2023-11-09 ASSESSMENT — PAIN SCALES - GENERAL: PAINLEVEL: NO PAIN (0)

## 2023-11-09 NOTE — PROGRESS NOTES
SUBJECTIVE:   CC: Sharon is an 58 year old who presents for preventive health visit.       11/9/2023    10:13 AM   Additional Questions   Roomed by Jelena Chu   Accompanied by N/A       Healthy Habits:     Getting at least 3 servings of Calcium per day:  NO    Bi-annual eye exam:  Yes    Dental care twice a year:  Yes    Sleep apnea or symptoms of sleep apnea:  None    Diet:  Regular (no restrictions)    Frequency of exercise:  2-3 days/week    Duration of exercise:  30-45 minutes    Taking medications regularly:  No    Barriers to taking medications:  Problems remembering to take them    Medication side effects:  None    Additional concerns today:  Yes    The 10-year ASCVD risk score (Marti SO, et al., 2019) is: 3.8%    Values used to calculate the score:      Age: 58 years      Sex: Female      Is Non- : No      Diabetic: No      Tobacco smoker: No      Systolic Blood Pressure: 133 mmHg      Is BP treated: No      HDL Cholesterol: 44 mg/dL      Total Cholesterol: 246 mg/dL      Really sore and stiff legs (posterior calf muscles and lateral thigh muscles) when getting out of the car, even for short 15 minute car rides. Gets better with movement within a few minutes. Also sore in the mornings. Has history of plantar fasciitis, finally improved this summer but still has first step symptoms especially on left. Still does stretching of ankles, rolling of muscles, wears shoes in the house, etc. Sometimes gets relief from chiropractic adjustments of back. No numbness or tingling, no joint pain, no leg swelling, and pain in leg muscles does not get worse with walking.    Last period 5-6 years ago. No vaginal bleeding since.    Has had a lot of headaches in the last month. Does not always drink water throughout the day due to concerns about urge incontinence. Normally using 3-4 x per month, uses 3 pills each time usually before headache stops. Used at least 5 times this month. Not great with  taking daily pills.     Was given atorvastatin at [facility] for persistently elevated LDL, but ASCVD <7% and recently saw [facility] in 2022 at which time they said a statin was not indicated. 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 She is not taking the statin anyway at this time.    Would like B12 rechecked, low in past and took supplements until it improved to normal, but then stopped taking supplements.    Interested in weight loss medications, would like to start with daily pill rather than Ozempic due to concerns about coverage and cost. Open to topiramate given worsening migraines.    Today's PHQ-2 Score:       2023     1:03 PM   PHQ-2 (  [employer])   Q1: Little interest or pleasure in doing things 0   Q2: Feeling down, depressed or hopeless 0   PHQ-2 Score 0   Q1: Little interest or pleasure in doing things Not at all   Q2: Feeling down, depressed or hopeless Not at all   PHQ-2 Score 0       Social History     Tobacco Use     Smoking status: Never     Smokeless tobacco: Never   Substance Use Topics     Alcohol use: Yes     Comment: 2 times per month, 1-3 drinks per time           2023    12:54 PM   Alcohol Use   Prescreen: >3 drinks/day or >7 drinks/week? No       Breast Cancer Screenin/8/2023     1:08 PM   Breast CA Risk Assessment (FHS-7)   Do you have a family history of breast, colon, or ovarian cancer? No / Unknown       Mammogram Screening: Recommended mammography every 1-2 years with patient discussion and risk factor consideration  Pertinent mammograms are reviewed under the imaging tab.    History of abnormal Pap smear: NO - age 30-65 PAP every 5 years with negative HPV co-testing recommended      Latest Ref Rng & Units 11/3/2021    10:41 AM 10/23/2015    10:20 AM 10/23/2015    12:00 AM   PAP / HPV   PAP  Negative for Intraepithelial Lesion or Malignancy (NILM)      PAP (Historical)    NIL    HPV 16 DNA Negative  "Negative  Negative     HPV 18 DNA Negative Negative  Negative     Other HR HPV Negative Negative  Negative       Reviewed and updated as needed this visit by clinical staff   Tobacco  Allergies  Meds   Med Hx  Surg Hx  Fam Hx  Soc Hx        Reviewed and updated as needed this visit by Provider   Tobacco     Med Hx  Surg Hx  Fam Hx  Soc Hx           Review of Systems   Constitutional:  Negative for chills and fever.   HENT:  Negative for congestion, ear pain, hearing loss and sore throat.    Eyes:  Negative for pain and visual disturbance.   Respiratory:  Negative for cough and shortness of breath.    Cardiovascular:  Negative for chest pain, palpitations and peripheral edema.   Gastrointestinal:  Positive for heartburn. Negative for abdominal pain, constipation, diarrhea, hematochezia and nausea.   Breasts:  Negative for tenderness, breast mass and discharge.   Genitourinary:  Positive for urgency. Negative for dysuria, frequency, genital sores, hematuria, pelvic pain, vaginal bleeding and vaginal discharge.   Musculoskeletal:  Negative for arthralgias, joint swelling and myalgias.   Skin:  Negative for rash.   Neurological:  Positive for headaches. Negative for dizziness, weakness and paresthesias.   Psychiatric/Behavioral:  Negative for mood changes. The patient is not nervous/anxious.         OBJECTIVE:   BP (!) 133/93 (BP Location: Right arm, Patient Position: Sitting, Cuff Size: Adult Large)   Pulse 71   Temp 97.8  F (36.6  C) (Tympanic)   Resp 20   Ht 1.691 m (5' 6.58\")   Wt 88.1 kg (194 lb 4.8 oz)   LMP 11/15/2015 (Approximate)   SpO2 100%   BMI 30.82 kg/m    Physical Exam  Gen: awake and alert, no apparent distress, appears stated age, sitting comfortably upright in chair.  HEENT: head atraumatic and normocephalic, hearing grossly normal, PERRLA, EOM grossly intact, no conjunctival injection or icterus, no nasal drainage, no oral ulcers, normal dentition, torus palatinus present, moist " mucous membranes  Neck: supple, no lymphadenopathy, normal thyroid, no stiffness, normal ROM  Back: spine is straight, spine and paraspinal muscles non-tender to palpation throughout, full ROM  CV: RRR, normal S1 and S2, no murmurs, rubs, or gallops, normal radial and DP pulses, normal capillary refill.  Pulm: CTAB, no wheezes, rhonchi, or rales. No increased WOB on room air.  Abd: soft, non-tender, non-distended, normal bowel sounds throughout.  Ext: no LE edema, no joint swelling, full ROM of all joints in upper and lower extremities and no tenderness to palpation of lower extremity musculature or feet  Skin: warm and dry, no rashes, pallor, cyanosis, jaundice, or bruising to visible skin.  Neuro: A&Ox3, answers questions appropriately, normal speech, CN II-XII grossly intact, normal muscle tone, moves all extremities equally.  Psych: normal affect, makes good eye contact      Diagnostic Test Results:  Labs reviewed in Epic  No results found for this or any previous visit (from the past 24 hour(s)).    ASSESSMENT/PLAN:   Sharon was seen today for physical.    Diagnoses and all orders for this visit:    Routine general medical examination at a health care facility  -     PRIMARY CARE FOLLOW-UP SCHEDULING; Future  -     Return for Tetanus vaccine (patient had to leave to another appt and will come back at a later date)    Visit for screening mammogram  -     MA SCREENING DIGITAL BILAT - Future  (s+30); Future    Migraine without aura and without status migrainosus, not intractable  -     SUMAtriptan (IMITREX) 100 MG tablet; Take 1 tablet atTAKE 1 TABLET BY MOUTH AT ONSET OF MIGRAINE. MAY REPEAST IN 2 HRS IF NEEDED. MAX 2 TABLETS PER 24HR  -     topiramate (TOPAMAX) 25 MG tablet; Take 1 tablet (25 mg) by mouth daily for 14 days, THEN 2 tablets (50 mg) daily for 60 days.    History of non anemic vitamin B12 deficiency  -     Vitamin B12; Future    Leg cramps  -     Hemoglobin; Future  -     Ferritin; Future  -      Continue plantar fasciitis cares (stretching, rolling muscles, wearing supportive shoes, etc)  -     Can trial magnesium at night for nocturnal leg cramps     Class 1 obesity due to excess calories without serious comorbidity with body mass index (BMI) of 30.0 to 30.9 in adult  -     topiramate (TOPAMAX) 25 MG tablet; Take 1 tablet (25 mg) by mouth daily for 14 days, THEN 2 tablets (50 mg) daily for 60 days.  -     return in 4-8 weeks for medication recheck and weight loss discussion            COUNSELING:  Reviewed preventive health counseling, as reflected in patient instructions        She reports that she has never smoked. She has never used smokeless tobacco.        Joy Esposito MD  [facility]

## 2023-11-09 NOTE — PATIENT INSTRUCTIONS
Vitamin D take 1000-2000IU (25-50mcg) daily.  Shingles vaccine - get at a pharmacy if insurance covers it.    Preventive Health Recommendations  Female Ages 50 - 64    Yearly exam: See your health care provider every year in order to  Review health changes.   Discuss preventive care.    Review your medicines if your doctor has prescribed any.    Get a Pap test every three years (unless you have an abnormal result and your provider advises testing more often).  If you get Pap tests with HPV test, you only need to test every 5 years, unless you have an abnormal result.   You do not need a Pap test if your uterus was removed (hysterectomy) and you have not had cancer.  You should be tested each year for STDs (sexually transmitted diseases) if you're at risk.   Have a mammogram every 1 to 2 years.  Have a colonoscopy at age 45, or have a yearly FIT test (stool test). These exams screen for colon cancer.    Have a cholesterol test every 5 years, or more often if advised.  Have a diabetes test (fasting glucose) every three years. If you are at risk for diabetes, you should have this test more often.   If you are at risk for osteoporosis (brittle bone disease), think about having a bone density scan (DEXA).    Shots: Get a flu shot each year. Get a tetanus shot every 10 years.    Nutrition:   Eat at least 5 servings of fruits and vegetables each day.  Eat whole-grain bread, whole-wheat pasta and brown rice instead of white grains and rice.  Get adequate Calcium and Vitamin D.     Lifestyle  Exercise at least 150 minutes a week (30 minutes a day, 5 days a week). This will help you control your weight and prevent disease.  Limit alcohol to one drink per day.  No smoking.   Wear sunscreen to prevent skin cancer.   See your dentist every six months for an exam and cleaning.  See your eye doctor every 1 to 2 years.

## 2023-11-16 NOTE — RESULT ENCOUNTER NOTE
Dear Sharon,    Your ferritin came back within normal limits (though on the lower end).  This is a measure of your iron stores.  Iron deficiency can cause leg cramps.  I also saw that your vitamin D was low, which may also be a cause.      For some reason the hemoglobin and vitamin B12 labs were not drawn.  These would help me interpret your ferritin level a bit better and inform a decision whether or not to start an iron supplement in addition to a vitamin D supplement to help your leg cramps.  Regardless, you could start increasing your iron intake by adding more iron-rich foods in your diet to see if that will help.    At your convenience, I recommend you schedule a lab-only appointment to draw those remaining labs.    Please feel free to call with any questions.      Sincerely,    Lisa Sauer MD

## 2023-12-11 ENCOUNTER — ALLIED HEALTH/NURSE VISIT (OUTPATIENT)
Dept: PEDIATRICS | Facility: CLINIC | Age: 58
End: 2023-12-11
Payer: COMMERCIAL

## 2023-12-11 DIAGNOSIS — Z23 NEED FOR VACCINATION: Primary | ICD-10-CM

## 2023-12-11 DIAGNOSIS — Z86.39 HISTORY OF NON ANEMIC VITAMIN B12 DEFICIENCY: ICD-10-CM

## 2023-12-11 DIAGNOSIS — R25.2 LEG CRAMPS: ICD-10-CM

## 2023-12-11 LAB — HGB BLD-MCNC: 14 G/DL (ref 11.7–15.7)

## 2023-12-11 PROCEDURE — 85018 HEMOGLOBIN: CPT

## 2023-12-11 PROCEDURE — 90471 IMMUNIZATION ADMIN: CPT

## 2023-12-11 PROCEDURE — 82607 VITAMIN B-12: CPT

## 2023-12-11 PROCEDURE — 36415 COLL VENOUS BLD VENIPUNCTURE: CPT

## 2023-12-11 PROCEDURE — 99207 PR NO CHARGE NURSE ONLY: CPT

## 2023-12-11 PROCEDURE — 90715 TDAP VACCINE 7 YRS/> IM: CPT

## 2023-12-12 LAB — VIT B12 SERPL-MCNC: 603 PG/ML (ref 232–1245)

## 2023-12-21 ENCOUNTER — OFFICE VISIT (OUTPATIENT)
Dept: PEDIATRICS | Facility: CLINIC | Age: 58
End: 2023-12-21
Payer: COMMERCIAL

## 2023-12-21 VITALS
BODY MASS INDEX: 30.3 KG/M2 | HEART RATE: 71 BPM | WEIGHT: 191 LBS | RESPIRATION RATE: 14 BRPM | SYSTOLIC BLOOD PRESSURE: 108 MMHG | TEMPERATURE: 97.4 F | OXYGEN SATURATION: 97 % | DIASTOLIC BLOOD PRESSURE: 72 MMHG

## 2023-12-21 DIAGNOSIS — G43.009 MIGRAINE WITHOUT AURA AND WITHOUT STATUS MIGRAINOSUS, NOT INTRACTABLE: ICD-10-CM

## 2023-12-21 DIAGNOSIS — E66.811 CLASS 1 OBESITY DUE TO EXCESS CALORIES WITHOUT SERIOUS COMORBIDITY WITH BODY MASS INDEX (BMI) OF 30.0 TO 30.9 IN ADULT: ICD-10-CM

## 2023-12-21 DIAGNOSIS — E66.09 CLASS 1 OBESITY DUE TO EXCESS CALORIES WITHOUT SERIOUS COMORBIDITY WITH BODY MASS INDEX (BMI) OF 30.0 TO 30.9 IN ADULT: ICD-10-CM

## 2023-12-21 PROCEDURE — 99213 OFFICE O/P EST LOW 20 MIN: CPT | Mod: GE

## 2023-12-21 RX ORDER — SUMATRIPTAN 100 MG/1
TABLET, FILM COATED ORAL
Qty: 27 TABLET | Refills: 11 | Status: SHIPPED | OUTPATIENT
Start: 2023-12-21

## 2023-12-21 RX ORDER — TOPIRAMATE 50 MG/1
TABLET, FILM COATED ORAL
Qty: 180 TABLET | Refills: 1 | Status: SHIPPED | OUTPATIENT
Start: 2023-12-21 | End: 2024-06-25

## 2023-12-21 NOTE — PROGRESS NOTES
"  Assessment & Plan     Migraine without aura and without status migrainosus, not intractable  - topiramate (TOPAMAX) 50 MG tablet  Dispense: 180 tablet; Refill: 1  - SUMAtriptan (IMITREX) 100 MG tablet  Dispense: 27 tablet; Refill: 11    Class 1 obesity due to excess calories without serious comorbidity with body mass index (BMI) of 30.0 to 30.9 in adult  Lost about 3 pounds since last visit. No side effects. Discussed expected outcomes from this medication being modest weight loss. Discussed option of other medications and/or referral to weight management, patient wants to stick with topiramate for now given improvement in headaches with this, so will increase dose.  - Discussed healthy exercise and nutrition changes  - topiramate (TOPAMAX) 50 MG tablet, increase dose to 75mg for one week then 100mg daily.  - plan to return in May/June for next visit when back from [place]  - Consider CBC at next visit while on topiramate       BMI:   Estimated body mass index is 30.3 kg/m  as calculated from the following:    Height as of 11/9/23: 1.691 m (5' 6.58\").    Weight as of this encounter: 86.6 kg (191 lb).   Weight management plan: Discussed healthy diet and exercise guidelines      Joy Esposito MD  [facility] GANESH Herrera is a 58 year old, presenting for the following health issues:  RECHECK        12/21/2023     1:31 PM   Additional Questions   Roomed by Haily Gaxiola CMA       History of Present Illness       Headaches:   Since the patient's last clinic visit, headaches are: no change  The patient is getting headaches:  1  She is not able to do normal daily activities when she has a migraine.  The patient is taking the following rescue/relief medications:  Sumatriptan (Imitrex)   Patient states \"I get some relief\" from the rescue/relief medications.   The patient is taking the following medications to prevent migraines:  Topomax  In the past 4 weeks, the patient has gone to an Urgent Care " or Emergency Room 0 times times due to headaches.    She eats 2-3 servings of fruits and vegetables daily.She consumes 0 sweetened beverage(s) daily.She exercises with enough effort to increase her heart rate 20 to 29 minutes per day.  She exercises with enough effort to increase her heart rate 3 or less days per week. She is missing 1 dose(s) of medications per week.     Had COVID two weeks ago, had headaches then which felt typical for her, took imitrex at night those days, otherwise no headaches outside of COVID infection.    No big change in appetite or weight loss since starting topiramate. Feels like some days she is hungry all day long (more with breakfast in the morning). No side effects or other symptoms from the medication. Does not drink carbonated drinks, no hx renal stones.    Breakfast - english muffin and fruit  Lunch - salad, veggies, chicken, eggs or other protein in salad  Dinner -  soup, meat and veggies  Not a snacker, does not have sugary drinks.     Going to [place] for the winter starting 12/26, will be back in May.     Wt Readings from Last 4 Encounters:   12/21/23 86.6 kg (191 lb)   11/09/23 88.1 kg (194 lb 4.8 oz)   11/03/21 87.4 kg (192 lb 11.2 oz)   06/04/20 86.7 kg (191 lb 1.6 oz)         Review of Systems   Constitutional, HEENT, cardiovascular, pulmonary, GI, , musculoskeletal, neuro, skin, endocrine and psych systems are negative, except as otherwise noted.      Wt Readings from Last 4 Encounters:   12/21/23 86.6 kg (191 lb)   11/09/23 88.1 kg (194 lb 4.8 oz)   11/03/21 87.4 kg (192 lb 11.2 oz)   06/04/20 86.7 kg (191 lb 1.6 oz)     BP Readings from Last 6 Encounters:   12/21/23 108/72   11/09/23 (!) 133/93   11/03/21 100/80   06/04/20 126/80   02/27/19 98/60   01/25/18 96/72             Objective    /72 (BP Location: Right arm, Patient Position: Sitting, Cuff Size: Adult Large)   Pulse 71   Temp 97.4  F (36.3  C) (Tympanic)   Resp 14   Wt 86.6 kg (191 lb)   LMP  11/15/2015 (Approximate)   SpO2 97%   BMI 30.30 kg/m    Body mass index is 30.3 kg/m .  Physical Exam   Gen: awake and alert, no apparent distress, appears stated age, sitting comfortably upright in chair  HEENT: head atraumatic and normocephalic, hearing grossly normal, PERRLA, EOM grossly intact, no conjunctival injection or icterus, no nasal drainage, no oral ulcers, normal dentition, moist mucous membranes  Neck: supple, no lymphadenopathy, normal thyroid, no stiffness, normal ROM  Back: spine is straight, spine and paraspinal muscles non-tender to palpation throughout, full ROM  CV: RRR, normal S1 and S2, no murmurs, rubs, or gallops, normal radial pulses, normal capillary refill.  Pulm: CTAB, no wheezes, rhonchi, or rales. No increased WOB on room air.  Abd: soft, non-tender, non-distended, normal bowel sounds throughout.  Ext: no LE edema, no joint swelling, full ROM of all joints in upper and lower extremities  Skin: warm and dry, no rashes, pallor, cyanosis, jaundice, or bruising to visible skin.  Neuro: A&Ox3, answers questions appropriately, normal speech, CN II-XII grossly intact, normal muscle tone, moves all extremities equally.  Psych: normal affect, makes good eye contact          ----- Service Performed and Documented by Resident or Fellow ------

## 2023-12-21 NOTE — PATIENT INSTRUCTIONS
Eat three meals a day.  Increase protein and fruits/veggies to keep you full.    Get shingles vaccine at the pharmacy when able.

## 2023-12-28 NOTE — TELEPHONE ENCOUNTER
We reviewed this medication during OV 6/4/20. She had stopped the medication and decided not to restart. If she is wanting to restart, can schedule virtual visit or E-visit.   Her visit with me was for breast lump but I don't see that she ever had mammogram or US.    MEDICATIONS  (PRN):  acetaminophen     Tablet .. 650 milliGRAM(s) Oral every 6 hours PRN Moderate Pain (4 - 6)  hydrOXYzine hydrochloride 25 milliGRAM(s) Oral every 6 hours PRN Anxiety  nicotine  Polacrilex Gum 2 milliGRAM(s) Oral every 2 hours PRN NRT  nicotine  Polacrilex Gum 2 milliGRAM(s) Oral every 4 hours PRN nicotine withdrawal

## 2024-11-05 ENCOUNTER — MYC MEDICAL ADVICE (OUTPATIENT)
Dept: PEDIATRICS | Facility: CLINIC | Age: 59
End: 2024-11-05
Payer: COMMERCIAL

## 2024-11-05 NOTE — TELEPHONE ENCOUNTER
RN recommended E-visit (per clinic policy) in order to address patient request for labs.    Olga Yañez RN

## 2024-12-01 SDOH — HEALTH STABILITY: PHYSICAL HEALTH: ON AVERAGE, HOW MANY MINUTES DO YOU ENGAGE IN EXERCISE AT THIS LEVEL?: 50 MIN

## 2024-12-01 SDOH — HEALTH STABILITY: PHYSICAL HEALTH: ON AVERAGE, HOW MANY DAYS PER WEEK DO YOU ENGAGE IN MODERATE TO STRENUOUS EXERCISE (LIKE A BRISK WALK)?: 4 DAYS

## 2024-12-01 ASSESSMENT — SOCIAL DETERMINANTS OF HEALTH (SDOH): HOW OFTEN DO YOU GET TOGETHER WITH FRIENDS OR RELATIVES?: THREE TIMES A WEEK

## 2024-12-02 ENCOUNTER — ANCILLARY PROCEDURE (OUTPATIENT)
Dept: MAMMOGRAPHY | Facility: CLINIC | Age: 59
End: 2024-12-02
Payer: COMMERCIAL

## 2024-12-02 DIAGNOSIS — Z12.31 VISIT FOR SCREENING MAMMOGRAM: ICD-10-CM

## 2024-12-02 PROCEDURE — 77067 SCR MAMMO BI INCL CAD: CPT | Mod: TC | Performed by: RADIOLOGY

## 2024-12-02 PROCEDURE — 77063 BREAST TOMOSYNTHESIS BI: CPT | Mod: TC | Performed by: RADIOLOGY

## 2024-12-03 ENCOUNTER — OFFICE VISIT (OUTPATIENT)
Dept: PEDIATRICS | Facility: CLINIC | Age: 59
End: 2024-12-03
Attending: INTERNAL MEDICINE
Payer: COMMERCIAL

## 2024-12-03 VITALS
SYSTOLIC BLOOD PRESSURE: 108 MMHG | TEMPERATURE: 98 F | HEIGHT: 67 IN | HEART RATE: 67 BPM | WEIGHT: 185.7 LBS | DIASTOLIC BLOOD PRESSURE: 66 MMHG | BODY MASS INDEX: 29.15 KG/M2 | RESPIRATION RATE: 18 BRPM | OXYGEN SATURATION: 96 %

## 2024-12-03 DIAGNOSIS — Z00.00 ROUTINE GENERAL MEDICAL EXAMINATION AT A HEALTH CARE FACILITY: Primary | ICD-10-CM

## 2024-12-03 DIAGNOSIS — E78.2 MIXED HYPERLIPIDEMIA: ICD-10-CM

## 2024-12-03 DIAGNOSIS — G25.81 RESTLESS LEG SYNDROME: ICD-10-CM

## 2024-12-03 DIAGNOSIS — G43.009 MIGRAINE WITHOUT AURA AND WITHOUT STATUS MIGRAINOSUS, NOT INTRACTABLE: ICD-10-CM

## 2024-12-03 DIAGNOSIS — K21.9 GASTROESOPHAGEAL REFLUX DISEASE WITHOUT ESOPHAGITIS: ICD-10-CM

## 2024-12-03 DIAGNOSIS — E55.9 VITAMIN D DEFICIENCY: ICD-10-CM

## 2024-12-03 DIAGNOSIS — I77.810 ASCENDING AORTA DILATATION (H): ICD-10-CM

## 2024-12-03 LAB
ALBUMIN SERPL BCG-MCNC: 4.4 G/DL (ref 3.5–5.2)
ALP SERPL-CCNC: 54 U/L (ref 40–150)
ALT SERPL W P-5'-P-CCNC: 21 U/L (ref 0–50)
ANION GAP SERPL CALCULATED.3IONS-SCNC: 11 MMOL/L (ref 7–15)
AST SERPL W P-5'-P-CCNC: 24 U/L (ref 0–45)
BILIRUB SERPL-MCNC: 0.5 MG/DL
BUN SERPL-MCNC: 14.3 MG/DL (ref 6–20)
CALCIUM SERPL-MCNC: 9.5 MG/DL (ref 8.8–10.4)
CHLORIDE SERPL-SCNC: 102 MMOL/L (ref 98–107)
CHOLEST SERPL-MCNC: 267 MG/DL
CREAT SERPL-MCNC: 0.83 MG/DL (ref 0.51–0.95)
EGFRCR SERPLBLD CKD-EPI 2021: 81 ML/MIN/1.73M2
FASTING STATUS PATIENT QL REPORTED: YES
FERRITIN SERPL-MCNC: 85 NG/ML (ref 11–328)
GLUCOSE SERPL-MCNC: 82 MG/DL (ref 70–99)
HCO3 SERPL-SCNC: 25 MMOL/L (ref 22–29)
HDLC SERPL-MCNC: 47 MG/DL
HGB BLD-MCNC: 14 G/DL (ref 11.7–15.7)
LDLC SERPL CALC-MCNC: 182 MG/DL
NONHDLC SERPL-MCNC: 220 MG/DL
POTASSIUM SERPL-SCNC: 4.2 MMOL/L (ref 3.4–5.3)
PROT SERPL-MCNC: 7.4 G/DL (ref 6.4–8.3)
SODIUM SERPL-SCNC: 138 MMOL/L (ref 135–145)
TRIGL SERPL-MCNC: 192 MG/DL
VIT D+METAB SERPL-MCNC: 18 NG/ML (ref 20–50)

## 2024-12-03 PROCEDURE — 82728 ASSAY OF FERRITIN: CPT | Performed by: STUDENT IN AN ORGANIZED HEALTH CARE EDUCATION/TRAINING PROGRAM

## 2024-12-03 PROCEDURE — 85018 HEMOGLOBIN: CPT | Performed by: STUDENT IN AN ORGANIZED HEALTH CARE EDUCATION/TRAINING PROGRAM

## 2024-12-03 PROCEDURE — 80061 LIPID PANEL: CPT | Performed by: STUDENT IN AN ORGANIZED HEALTH CARE EDUCATION/TRAINING PROGRAM

## 2024-12-03 PROCEDURE — 82306 VITAMIN D 25 HYDROXY: CPT | Performed by: STUDENT IN AN ORGANIZED HEALTH CARE EDUCATION/TRAINING PROGRAM

## 2024-12-03 PROCEDURE — 80053 COMPREHEN METABOLIC PANEL: CPT | Performed by: STUDENT IN AN ORGANIZED HEALTH CARE EDUCATION/TRAINING PROGRAM

## 2024-12-03 PROCEDURE — 36415 COLL VENOUS BLD VENIPUNCTURE: CPT | Performed by: STUDENT IN AN ORGANIZED HEALTH CARE EDUCATION/TRAINING PROGRAM

## 2024-12-03 PROCEDURE — 99396 PREV VISIT EST AGE 40-64: CPT | Performed by: STUDENT IN AN ORGANIZED HEALTH CARE EDUCATION/TRAINING PROGRAM

## 2024-12-03 PROCEDURE — 99214 OFFICE O/P EST MOD 30 MIN: CPT | Mod: 25 | Performed by: STUDENT IN AN ORGANIZED HEALTH CARE EDUCATION/TRAINING PROGRAM

## 2024-12-03 RX ORDER — SUMATRIPTAN SUCCINATE 100 MG/1
TABLET ORAL
Qty: 27 TABLET | Refills: 3 | Status: SHIPPED | OUTPATIENT
Start: 2024-12-03

## 2024-12-03 RX ORDER — OMEPRAZOLE 40 MG/1
40 CAPSULE, DELAYED RELEASE ORAL DAILY
Qty: 30 CAPSULE | Refills: 1 | Status: CANCELLED | OUTPATIENT
Start: 2024-12-03

## 2024-12-03 RX ORDER — OMEPRAZOLE 40 MG/1
40 CAPSULE, DELAYED RELEASE ORAL DAILY
Qty: 30 CAPSULE | Refills: 1 | Status: SHIPPED | OUTPATIENT
Start: 2024-12-03

## 2024-12-03 ASSESSMENT — PAIN SCALES - GENERAL: PAINLEVEL_OUTOF10: NO PAIN (0)

## 2024-12-03 NOTE — PROGRESS NOTES
Preventive Care Visit  [facility] GANESH Lopez MD, Internal Medicine  Dec 3, 2024      Assessment & Plan     Routine general medical examination at a health care facility  Presents today for routine visit  - PRIMARY CARE FOLLOW-UP SCHEDULING  - Comprehensive metabolic panel (BMP + Alb, Alk Phos, ALT, AST, Total. Bili, TP); Future  - Comprehensive metabolic panel (BMP + Alb, Alk Phos, ALT, AST, Total. Bili, TP)    Ascending aorta dilatation (H)   Noted incidentally at [place] with borderline enlargement of the ascending aorta at 41 mm. With this, annual follow-up is advised. If stable over the next three years, then interval can be lengthened to every other year. Of note, at the time of the echocardiogram the abdominal aorta was also visualized, and no enlargement was seen.   - Repeat echo ordered    Mixed hyperlipidemia  Had been on a statin in the past and then discontinued   - Lipid panel reflex to direct LDL Fasting; Future  - Lipid panel reflex to direct LDL Fasting    Gastroesophageal reflux disease without esophagitis  Notes that she does not take this every day.  Will also take tums  - omeprazole (PRILOSEC) 40 MG DR capsule; Take 1 capsule (40 mg) by mouth daily.    Migraine without aura and without status migrainosus, not intractable  Uses usually 2-6 times per month for migraine management   - SUMAtriptan (IMITREX) 100 MG tablet; Take 1 tablet atTAKE 1 TABLET BY MOUTH AT ONSET OF MIGRAINE. MAY REPEAST IN 2 HRS IF NEEDED. MAX 2 TABLETS PER 24HR    Vitamin D deficiency  - Vitamin D Deficiency; Future  - Vitamin D Deficiency    Restless leg syndrome  Notes restless leg symptoms at night.  Takes magnesium.  Ferritin was <75 last year. Will recheck today, might benefit from iron supplementation  - Ferritin; Future  - Hemoglobin; Future  - Ferritin  - Hemoglobin    Urinary incontinence  Notes multiple episodes of incontinence per day, that sound most consistent with urge incontinence.   "Tried pelvic flood PT without relief.  Would be interested in medication.  Will plan to return to clinic to discuss further given time limits of today visit             BMI  Estimated body mass index is 29.52 kg/m  as calculated from the following:    Height as of this encounter: 1.689 m (5' 6.5\").    Weight as of this encounter: 84.2 kg (185 lb 11.2 oz).       Counseling  Appropriate preventive services were addressed with this patient via screening, questionnaire, or discussion as appropriate for fall prevention, nutrition, physical activity, Tobacco-use cessation, social engagement, weight loss and cognition.  Checklist reviewing preventive services available has been given to the patient.  Reviewed patient's diet, addressing concerns and/or questions.           Ludwin Herrera is a 59 year old, presenting for the following:  Physical        12/3/2024     8:47 AM   Additional Questions   Roomed by Aura Wells CMA          HPI  FASTING today  Declines covid and flu booster vaccines    2-6 sumatriptan per month     1-2 accidents per day   -Not with laughting   -As soon as I turn into the street   -Never been on meds     Restless legs - taking magnesium which is helpful           Health Care Directive  Patient does not have a Health Care Directive: Discussed advance care planning with patient; however, patient declined at this time.      12/1/2024   General Health   How would you rate your overall physical health? Good   Feel stress (tense, anxious, or unable to sleep) Not at all            12/1/2024   Nutrition   Three or more servings of calcium each day? (!) NO   Diet: Breakfast skipped   How many servings of fruit and vegetables per day? (!) 2-3   How many sweetened beverages each day? 0-1            12/1/2024   Exercise   Days per week of moderate/strenous exercise 4 days   Average minutes spent exercising at this level 50 min    - lifts weight, play pickleball         12/1/2024   Social Factors "   Frequency of gathering with friends or relatives Three times a week   Worry food won't last until get money to buy more No   Food not last or not have enough money for food? No   Do you have housing? (Housing is defined as stable permanent housing and does not include staying ouside in a car, in a tent, in an abandoned building, in an overnight shelter, or couch-surfing.) Yes   Are you worried about losing your housing? No   Lack of transportation? No   Unable to get utilities (heat,electricity)? No            12/1/2024   Fall Risk   Fallen 2 or more times in the past year? No    Trouble with walking or balance? No        Patient-reported          12/1/2024   Dental   Dentist two times every year? Yes            12/1/2024   TB Screening   Were you born outside of the [place]? No            Today's PHQ-2 Score:       12/3/2024     8:45 AM   PHQ-2 ( 1999 Pf[employer])   Q1: Little interest or pleasure in doing things 0    Q2: Feeling down, depressed or hopeless 0    PHQ-2 Score 0    Q1: Little interest or pleasure in doing things Not at all   Q2: Feeling down, depressed or hopeless Not at all   PHQ-2 Score 0       Patient-reported           12/1/2024   Substance Use   Alcohol more than 3/day or more than 7/wk No   Do you use any other substances recreationally? No        Social History     Tobacco Use    Smoking status: Never    Smokeless tobacco: Never   Vaping Use    Vaping status: Never Used   Substance Use Topics    Alcohol use: Yes     Comment: 2 times per month, 1-3 drinks per time    Drug use: No           12/6/2021   LAST FHS-7 RESULTS   1st degree relative breast or ovarian cancer No   Any relative bilateral breast cancer No   Any male have breast cancer No   Any ONE woman have BOTH breast AND ovarian cancer No   Any woman with breast cancer before 50yrs No   2 or more relatives with breast AND/OR ovarian cancer No   2 or more relatives with breast AND/OR bowel cancer No                   12/1/2024   STI Screening  "  New sexual partner(s) since last STI/HIV test? No        History of abnormal Pap smear: No - age 30- 64 PAP with HPV every 5 years recommended        Latest Ref Rng & Units 11/3/2021    10:41 AM 10/23/2015    10:20 AM 10/23/2015    12:00 AM   PAP / HPV   PAP  Negative for Intraepithelial Lesion or Malignancy (NILM)      PAP (Historical)    NIL    HPV 16 DNA Negative Negative  Negative     HPV 18 DNA Negative Negative  Negative     Other HR HPV Negative Negative  Negative       ASCVD Risk   The 10-year ASCVD risk score (Marti SO, et al., 2019) is: 2.8%    Values used to calculate the score:      Age: 59 years      Sex: Female      Is Non- : No      Diabetic: No      Tobacco smoker: No      Systolic Blood Pressure: 108 mmHg      Is BP treated: No      HDL Cholesterol: 44 mg/dL      Total Cholesterol: 246 mg/dL           Reviewed and updated as needed this visit by Provider                             Objective    Exam  /66 (BP Location: Right arm, Cuff Size: Adult Regular)   Pulse 67   Temp 98  F (36.7  C) (Tympanic)   Resp 18   Ht 1.689 m (5' 6.5\")   Wt 84.2 kg (185 lb 11.2 oz)   LMP 11/15/2015 (Approximate)   SpO2 96%   BMI 29.52 kg/m     Estimated body mass index is 29.52 kg/m  as calculated from the following:    Height as of this encounter: 1.689 m (5' 6.5\").    Weight as of this encounter: 84.2 kg (185 lb 11.2 oz).    Physical Exam  GENERAL: alert and no distress  EYES: Eyes grossly normal to inspection, PERRL and conjunctivae and sclerae normal  HENT: ear canals and TM's normal, nose and mouth without ulcers or lesions  NECK: no adenopathy, no asymmetry, masses, or scars  RESP: lungs clear to auscultation - no rales, rhonchi or wheezes  CV: regular rate and rhythm, normal S1 S2, no S3 or S4, no murmur, click or rub, no peripheral edema  ABDOMEN: soft, nontender, no hepatosplenomegaly, no masses   MS: no gross musculoskeletal defects noted, no edema  SKIN: no " suspicious lesions or rashes  NEURO: Normal strength and tone, mentation intact and speech normal  PSYCH: mentation appears normal, affect normal/bright        Signed Electronically by: Chio Lopez MD

## 2024-12-03 NOTE — PATIENT INSTRUCTIONS
Patient Education   Preventive Care Advice   This is general advice given by our system to help you stay healthy. However, your care team may have specific advice just for you. Please talk to your care team about your preventive care needs.  Nutrition  Eat 5 or more servings of fruits and vegetables each day.  Try wheat bread, brown rice and whole grain pasta (instead of white bread, rice, and pasta).  Get enough calcium and vitamin D. Check the label on foods and aim for 100% of the RDA (recommended daily allowance).  Lifestyle  Exercise at least 150 minutes each week  (30 minutes a day, 5 days a week).  Do muscle strengthening activities 2 days a week. These help control your weight and prevent disease.  No smoking.  Wear sunscreen to prevent skin cancer.  Have a dental exam and cleaning every 6 months.  Yearly exams  See your health care team every year to talk about:  Any changes in your health.  Any medicines your care team has prescribed.  Preventive care, family planning, and ways to prevent chronic diseases.  Shots (vaccines)   HPV shots (up to age 26), if you've never had them before.  Hepatitis B shots (up to age 59), if you've never had them before.  COVID-19 shot: Get this shot when it's due.  Flu shot: Get a flu shot every year.  Tetanus shot: Get a tetanus shot every 10 years.  Pneumococcal, hepatitis A, and RSV shots: Ask your care team if you need these based on your risk.  Shingles shot (for age 50 and up)  General health tests  Diabetes screening:  Starting at age 35, Get screened for diabetes at least every 3 years.  If you are younger than age 35, ask your care team if you should be screened for diabetes.  Cholesterol test: At age 39, start having a cholesterol test every 5 years, or more often if advised.  Bone density scan (DEXA): At age 50, ask your care team if you should have this scan for osteoporosis (brittle bones).  Hepatitis C: Get tested at least once in your life.  STIs (sexually  transmitted infections)  Before age 24: Ask your care team if you should be screened for STIs.  After age 24: Get screened for STIs if you're at risk. You are at risk for STIs (including HIV) if:  You are sexually active with more than one person.  You don't use condoms every time.  You or a partner was diagnosed with a sexually transmitted infection.  If you are at risk for HIV, ask about PrEP medicine to prevent HIV.  Get tested for HIV at least once in your life, whether you are at risk for HIV or not.  Cancer screening tests  Cervical cancer screening: If you have a cervix, begin getting regular cervical cancer screening tests starting at age 21.  Breast cancer scan (mammogram): If you've ever had breasts, begin having regular mammograms starting at age 40. This is a scan to check for breast cancer.  Colon cancer screening: It is important to start screening for colon cancer at age 45.  Have a colonoscopy test every 10 years (or more often if you're at risk) Or, ask your provider about stool tests like a FIT test every year or Cologuard test every 3 years.  To learn more about your testing options, visit:   .  For help making a decision, visit:   https://bit.ly/kh18525.  Prostate cancer screening test: If you have a prostate, ask your care team if a prostate cancer screening test (PSA) at age 55 is right for you.  Lung cancer screening: If you are a current or former smoker ages 50 to 80, ask your care team if ongoing lung cancer screenings are right for you.  For informational purposes only. Not to replace the advice of your health care provider. Copyright   2023 [employer]. All rights reserved. Clinically reviewed by the [facility]. [employer] 350919 - REV 01/24.

## 2024-12-04 PROBLEM — I77.810 ASCENDING AORTA DILATATION (H): Status: ACTIVE | Noted: 2024-12-04

## 2024-12-20 ENCOUNTER — VIRTUAL VISIT (OUTPATIENT)
Dept: PEDIATRICS | Facility: CLINIC | Age: 59
End: 2024-12-20
Payer: COMMERCIAL

## 2024-12-20 DIAGNOSIS — N39.41 URGE INCONTINENCE OF URINE: Primary | ICD-10-CM

## 2024-12-20 PROCEDURE — 99442 PR PHYSICIAN TELEPHONE EVALUATION 11-20 MIN: CPT | Mod: 93 | Performed by: STUDENT IN AN ORGANIZED HEALTH CARE EDUCATION/TRAINING PROGRAM

## 2024-12-20 RX ORDER — OXYBUTYNIN CHLORIDE 5 MG/1
5 TABLET, EXTENDED RELEASE ORAL DAILY
Qty: 30 TABLET | Refills: 1 | Status: SHIPPED | OUTPATIENT
Start: 2024-12-20

## 2024-12-20 NOTE — PROGRESS NOTES
"Sharon is a 59 year old who is being evaluated via a billable telephone visit.    What phone number would you like to be contacted at? [phone]  How would you like to obtain your AVS? bL  Originating Location (pt. Location): Home    Distant Location (provider location):  On-site    Assessment & Plan     Urge incontinence of urine  Presents today to discuss ongoing incontinence.  Notes that she will have episodes of incontinence daily.  She has tried pelvic floor PT without relief.  No  clear bladder irritants and no other associated symptoms. At this point she notes that she would be interested in trying a medication to see if this provides some relief.  Will trial oxybutynin to see if this provides some relief.   Rx sent to pharmacy   - oxyBUTYnin ER (DITROPAN XL) 5 MG 24 hr tablet; Take 1 tablet (5 mg) by mouth daily.          BMI  Estimated body mass index is 29.52 kg/m  as calculated from the following:    Height as of 12/3/24: 1.689 m (5' 6.5\").    Weight as of 12/3/24: 84.2 kg (185 lb 11.2 oz).             Subjective   Sharon is a 59 year old, presenting for the following health issues:  Follow Up        12/20/2024     2:20 PM   Additional Questions   Roomed by miss   Accompanied by self         12/20/2024     2:20 PM   Patient Reported Additional Medications   Patient reports taking the following new medications no     HPI   No issues with coughing or jumping     Turn to come up the stress to her house or pick out clothes    1-2 times a day will have incontinence episodes    Did pevic floor pt and it didn't help at all    No hematuria or pain with urinating    Drinks 1-2 cups of coffee in the am- tried stopping in the past and didn't help     No soda really     No tobacco     Mostly reports sudden urges to urinate     Normal bms without constipation     Only wears black because of concern for accidents                     Objective       The 10-year ASCVD risk score (Marti SO, et al., 2019) is: 2.8%    " Values used to calculate the score:      Age: 59 years      Sex: Female      Is Non- : No      Diabetic: No      Tobacco smoker: No      Systolic Blood Pressure: 108 mmHg      Is BP treated: No      HDL Cholesterol: 47 mg/dL      Total Cholesterol: 267 mg/dL      Vitals:  No vitals were obtained today due to virtual visit.    Physical Exam   General: Alert and no distress //Respiratory: No audible wheeze, cough, or shortness of breath // Psychiatric:  Appropriate affect, tone, and pace of words            Phone call duration: 18 minutes  Signed Electronically by: Chio Lopez MD

## 2025-01-02 DIAGNOSIS — K21.9 GASTROESOPHAGEAL REFLUX DISEASE WITHOUT ESOPHAGITIS: ICD-10-CM

## 2025-01-02 RX ORDER — OMEPRAZOLE 40 MG/1
40 CAPSULE, DELAYED RELEASE ORAL DAILY
Qty: 90 CAPSULE | Refills: 1 | Status: SHIPPED | OUTPATIENT
Start: 2025-01-02

## 2025-01-18 DIAGNOSIS — N39.41 URGE INCONTINENCE OF URINE: ICD-10-CM

## 2025-01-20 RX ORDER — OXYBUTYNIN CHLORIDE 5 MG/1
5 TABLET, EXTENDED RELEASE ORAL DAILY
Qty: 90 TABLET | Refills: 1 | OUTPATIENT
Start: 2025-01-20

## 2025-02-06 ENCOUNTER — HOSPITAL ENCOUNTER (OUTPATIENT)
Dept: CARDIOLOGY | Facility: CLINIC | Age: 60
Discharge: HOME | End: 2025-02-06
Attending: STUDENT IN AN ORGANIZED HEALTH CARE EDUCATION/TRAINING PROGRAM
Payer: COMMERCIAL

## 2025-02-06 DIAGNOSIS — I77.810 ASCENDING AORTA DILATATION: ICD-10-CM

## 2025-02-06 LAB — LVEF ECHO: NORMAL

## 2025-02-06 PROCEDURE — 93306 TTE W/DOPPLER COMPLETE: CPT

## 2025-02-07 PROBLEM — I77.810 ASCENDING AORTA DILATATION: Status: ACTIVE | Noted: 2024-12-04

## 2025-05-05 ENCOUNTER — OFFICE VISIT (OUTPATIENT)
Dept: PEDIATRICS | Facility: CLINIC | Age: 60
End: 2025-05-05
Payer: COMMERCIAL

## 2025-05-05 VITALS
BODY MASS INDEX: 27.04 KG/M2 | SYSTOLIC BLOOD PRESSURE: 127 MMHG | DIASTOLIC BLOOD PRESSURE: 83 MMHG | TEMPERATURE: 98 F | RESPIRATION RATE: 16 BRPM | OXYGEN SATURATION: 99 % | HEIGHT: 67 IN | WEIGHT: 172.3 LBS | HEART RATE: 68 BPM

## 2025-05-05 DIAGNOSIS — R79.89 ELEVATED SERUM CREATININE: ICD-10-CM

## 2025-05-05 DIAGNOSIS — E55.9 VITAMIN D DEFICIENCY: ICD-10-CM

## 2025-05-05 DIAGNOSIS — N39.41 URGE INCONTINENCE OF URINE: ICD-10-CM

## 2025-05-05 DIAGNOSIS — R55 SYNCOPE, UNSPECIFIED SYNCOPE TYPE: Primary | ICD-10-CM

## 2025-05-05 PROCEDURE — 99214 OFFICE O/P EST MOD 30 MIN: CPT | Mod: 25 | Performed by: STUDENT IN AN ORGANIZED HEALTH CARE EDUCATION/TRAINING PROGRAM

## 2025-05-05 PROCEDURE — 3079F DIAST BP 80-89 MM HG: CPT | Performed by: STUDENT IN AN ORGANIZED HEALTH CARE EDUCATION/TRAINING PROGRAM

## 2025-05-05 PROCEDURE — 90677 PCV20 VACCINE IM: CPT | Performed by: STUDENT IN AN ORGANIZED HEALTH CARE EDUCATION/TRAINING PROGRAM

## 2025-05-05 PROCEDURE — 80048 BASIC METABOLIC PNL TOTAL CA: CPT | Performed by: STUDENT IN AN ORGANIZED HEALTH CARE EDUCATION/TRAINING PROGRAM

## 2025-05-05 PROCEDURE — 36415 COLL VENOUS BLD VENIPUNCTURE: CPT | Performed by: STUDENT IN AN ORGANIZED HEALTH CARE EDUCATION/TRAINING PROGRAM

## 2025-05-05 PROCEDURE — 90471 IMMUNIZATION ADMIN: CPT | Performed by: STUDENT IN AN ORGANIZED HEALTH CARE EDUCATION/TRAINING PROGRAM

## 2025-05-05 PROCEDURE — 90750 HZV VACC RECOMBINANT IM: CPT | Performed by: STUDENT IN AN ORGANIZED HEALTH CARE EDUCATION/TRAINING PROGRAM

## 2025-05-05 PROCEDURE — 82306 VITAMIN D 25 HYDROXY: CPT | Performed by: STUDENT IN AN ORGANIZED HEALTH CARE EDUCATION/TRAINING PROGRAM

## 2025-05-05 PROCEDURE — G2211 COMPLEX E/M VISIT ADD ON: HCPCS | Performed by: STUDENT IN AN ORGANIZED HEALTH CARE EDUCATION/TRAINING PROGRAM

## 2025-05-05 PROCEDURE — 90472 IMMUNIZATION ADMIN EACH ADD: CPT | Performed by: STUDENT IN AN ORGANIZED HEALTH CARE EDUCATION/TRAINING PROGRAM

## 2025-05-05 PROCEDURE — 3074F SYST BP LT 130 MM HG: CPT | Performed by: STUDENT IN AN ORGANIZED HEALTH CARE EDUCATION/TRAINING PROGRAM

## 2025-05-05 NOTE — PROGRESS NOTES
"  Assessment & Plan     Syncope, unspecified syncope type  Presents today for follow up after episode of syncope while she was in [place].  Episode occurred on 4/11 when she had been playing pickleball for over 2 hours, notes that she had not hydrated well and began to feel lightheaded and utimately had an episode of syncope.  She notes similar episodes of near syncope in the past when she had exercised and then not drank enough water.  She was seen in the ED and treated with IV fluids with complete resolution of symptoms.  Since that time she has had no further episodes and overall feels well.  She denies chest pain or palpitations     Elevated serum creatinine  Noted to be elevated at the time of evaluation in [place], likely in the setting of dehydration.  This was repeated today  - Basic metabolic panel  (Ca, Cl, CO2, Creat, Gluc, K, Na, BUN); Future  - Basic metabolic panel  (Ca, Cl, CO2, Creat, Gluc, K, Na, BUN)    Vitamin D deficiency  Notes that she did not end up taking a supplement as she was outside in [place].  Discussed that she would likely benefit   - Vitamin D Deficiency    Urge incontinence of urine   Notes that side effects is hoping to try increaing dose  - increased to 15 mg daily     The longitudinal plan of care for the diagnosis(es)/condition(s) as documented were addressed during this visit. Due to the added complexity in care, I will continue to support Sharon in the subsequent management and with ongoing continuity of care.    BMI  Estimated body mass index is 26.99 kg/m  as calculated from the following:    Height as of this encounter: 1.702 m (5' 7\").    Weight as of this encounter: 78.2 kg (172 lb 4.8 oz).             Ludwin Herrera is a 59 year old, presenting for the following health issues:  No chief complaint on file.    HPI          Hospital Follow-up Visit:    Hospital/Nursing Home/IP Rehab Facility: [facility] ED [facility] 4/11/2025  Date of Admission: " 4/11/2025   Date of Discharge: 4/11/2025   Reason(s) for Admission: DEHYDRATION AND OVERHEAT  Was the patient in the ICU or did the patient experience delirium during hospitalization?  No  Do you have any other stressors you would like to discuss with your provider? No    Problems taking medications regularly:  None  Medication changes since discharge: None  Problems adhering to non-medication therapy:  None    Summary of hospitalization:  See outside records, reviewed and scanned  Diagnostic Tests/Treatments reviewed.  Follow up needed: BLOOD WORK   Other Healthcare Providers Involved in Patient s Care:         None  Update since discharge: improved.         Plan of care communicated with patient      Playing pickleball and had an episode of syncope.  Was transported to the ER via EMS    Was in [facility].  Notes that she has had other episodes of presyncope usually on days when she is very active and not well hydrated.  No associated chest pain, palpitations or post ictal state.  She otherwise feels well and has not had any symptoms since.  Treated with fluid resuscitation in the ER with full resolution of symptoms . Was noted to have TAMICA at the time.     Interested in increasing oxybutinin  - more accidents no other symptoms                              Objective    LMP 11/15/2015 (Approximate)   There is no height or weight on file to calculate BMI.  Physical Exam   GENERAL: alert and no distress  RESP: lungs clear to auscultation - no rales, rhonchi or wheezes  CV: regular rate and rhythm, normal S1 S2, no S3 or S4, no murmur, click or rub, no peripheral edema  ABDOMEN: soft, nontender, no hepatosplenomegaly, no masses and bowel sounds normal  MS: no gross musculoskeletal defects noted, no edema  SKIN: no suspicious lesions or rashes  NEURO: Normal strength and tone, mentation intact and speech normal  PSYCH: mentation appears normal, affect normal/bright            Signed Electronically by: Chio MEYER  MD John

## 2025-05-06 LAB
ANION GAP SERPL CALCULATED.3IONS-SCNC: 11 MMOL/L (ref 7–15)
BUN SERPL-MCNC: 12 MG/DL (ref 6–20)
CALCIUM SERPL-MCNC: 9.8 MG/DL (ref 8.8–10.4)
CHLORIDE SERPL-SCNC: 105 MMOL/L (ref 98–107)
CREAT SERPL-MCNC: 0.83 MG/DL (ref 0.51–0.95)
EGFRCR SERPLBLD CKD-EPI 2021: 81 ML/MIN/1.73M2
GLUCOSE SERPL-MCNC: 91 MG/DL (ref 70–99)
HCO3 SERPL-SCNC: 25 MMOL/L (ref 22–29)
POTASSIUM SERPL-SCNC: 4.4 MMOL/L (ref 3.4–5.3)
SODIUM SERPL-SCNC: 141 MMOL/L (ref 135–145)
VIT D+METAB SERPL-MCNC: 24 NG/ML (ref 20–50)

## 2025-05-07 RX ORDER — OXYBUTYNIN CHLORIDE 5 MG/1
15 TABLET, EXTENDED RELEASE ORAL DAILY
Qty: 180 TABLET | Refills: 1 | Status: SHIPPED | OUTPATIENT
Start: 2025-05-07